# Patient Record
Sex: FEMALE | Race: WHITE
[De-identification: names, ages, dates, MRNs, and addresses within clinical notes are randomized per-mention and may not be internally consistent; named-entity substitution may affect disease eponyms.]

---

## 2017-04-07 ENCOUNTER — HOSPITAL ENCOUNTER (EMERGENCY)
Dept: HOSPITAL 68 - ERH | Age: 47
End: 2017-04-07
Payer: COMMERCIAL

## 2017-04-07 VITALS — WEIGHT: 128 LBS | BODY MASS INDEX: 21.85 KG/M2 | HEIGHT: 64 IN

## 2017-04-07 VITALS — DIASTOLIC BLOOD PRESSURE: 61 MMHG | SYSTOLIC BLOOD PRESSURE: 124 MMHG

## 2017-04-07 DIAGNOSIS — R07.9: ICD-10-CM

## 2017-04-07 DIAGNOSIS — F17.210: ICD-10-CM

## 2017-04-07 DIAGNOSIS — J44.9: Primary | ICD-10-CM

## 2017-04-07 LAB
ABSOLUTE GRANULOCYTE CT: 10.6 /CUMM (ref 1.4–6.5)
BASOPHILS # BLD: 0.1 /CUMM (ref 0–0.2)
BASOPHILS NFR BLD: 0.6 % (ref 0–2)
EOSINOPHIL # BLD: 0.4 /CUMM (ref 0–0.7)
EOSINOPHIL NFR BLD: 3 % (ref 0–5)
ERYTHROCYTE [DISTWIDTH] IN BLOOD BY AUTOMATED COUNT: 14.5 % (ref 11.5–14.5)
GRANULOCYTES NFR BLD: 78.7 % (ref 42.2–75.2)
HCT VFR BLD CALC: 39.4 % (ref 37–47)
LYMPHOCYTES # BLD: 1.5 /CUMM (ref 1.2–3.4)
MCH RBC QN AUTO: 30.5 PG (ref 27–31)
MCHC RBC AUTO-ENTMCNC: 33.7 G/DL (ref 33–37)
MCV RBC AUTO: 90.7 FL (ref 81–99)
MONOCYTES # BLD: 0.9 /CUMM (ref 0.1–0.6)
PLATELET # BLD: 446 /CUMM (ref 130–400)
PMV BLD AUTO: 6.2 FL (ref 7.4–10.4)
RED BLOOD CELL CT: 4.35 /CUMM (ref 4.2–5.4)
WBC # BLD AUTO: 13.5 /CUMM (ref 4.8–10.8)

## 2017-04-07 NOTE — ED DYSPNEA/ASTHMA COMPLAINT
History of Present Illness
 
General
Chief Complaint: Dyspnea (COPD, CHF, Other)
Stated Complaint: SOB SINCE LAST PM
Source: patient
Exam Limitations: no limitations
Allergies
Coded Allergies:
acetaminophen (From FIORICET) (PT REMEMBERS GETTING REALLY SICK 17)
butalbital (From FIORICET) (PT REMEMBERS GETTING REALLY SICK 17)
caffeine (From FIORICET) (PT REMEMBERS GETTING REALLY SICK 17)
tramadol (Severe, SEIZURE 17)
 
Triage Note:
TRIAGE: PT TO ER C/C DIFFICULTY BREATHING SINCE
 LAST NIGHT, WORSE TODAY.  TODAY ALSO HAVING C/P.
 USING INHALERS WITH SLIGHT/TEMPORARY RELIEF NOTED.
 +TACHYPNEA, +INCREASED WORK OF BREATHING.
 DIFFICULTY SPEAKING IN COMPLETE SENTENCES.
Triage Nurses Notes Reviewed? yes
HPI:
This patient is a 46-year-old female with past medical history including 
pneumonia and COPD who presented to the emergency department today for 
evaluation of difficulty breathing.  The patient reported that she began having 
difficulty breathing last night.  She reported that it has been progressively 
getting worse.  She has been using her inhaler without any relief of her 
symptoms.  She reported that she had quit smoking, but recently started smoking 
again 3 days ago.  The patient reported that she has had similar symptoms in the
past when she had pneumonia.  The patient reported that she is having some left-
sided chest pain with deep inspiration.  It is approximately an 8 out of 10, 
aching, nonradiating.  The pain is constant.  The patient does not have a 
history of any blood clots.  She is not on any exogenous estrogen or birth 
control.  No unilateral leg swelling or pain.  The patient has not had any 
recent trauma, surgery, or prolonged immobilization.  She does have a cough 
intimately productive of clear sputum.  She reported associated nausea.  No 
abdominal pain or vomiting.
(CESILIA PETER,MIKALA)
 
Vital Signs & Intake/Output
Vital Signs & Intake/Output
 Vital Signs
 
 
Date Time Temp Pulse Resp B/P Pulse O2 O2 Flow FiO2
 
     Ox Delivery Rate 
 
1907     95 Room Air Room Air 
 
 190 98.0 64 20 124/61 95 Room Air Room Air 
 
 1747     96   
 
 1702 97.5 66 28 123/79 91 Room Air  
 
 
 ED Intake and Output
 
 
  0000  1200
 
Intake Total  
 
Output Total  
 
Balance  
 
   
 
Patient 128 lb 
 
Weight  
 
 
Reconcile Medications
Albuterol Sulfate (Proair Hfa) 0.09 MG/Actuation VINCENT   2 PUFF INH PRN COPD  (
Reported)
Aripiprazole (Abilify) 30 MG TABLET   1 TAB PO DAILY DEPRESSION  (Reported)
Azithromycin 250 MG TABLET   1 DP PO AD COPD
     2 the first day followed by 1 for days 2-5
Carbamazepine 200 MG TAB   2 TAB PO DAILY BIPOLAR  (Reported)
Carbamazepine (Tegretol) 200 MG TAB   3 TAB PO AT BEDTIME BIPOLAR  (Reported)
Gabapentin 300 MG CAPSULE   2 CAP PO 4 TIMES/DAY MENTAL HEALTH  (Reported)
Hydroxyzine Pamoate (Vistaril) 50 MG CAPSULE   2 TAB PO NIGHTLY MENTAL HEALTH  (
Reported)
Methylprednisolone. (Medrol) 4 MG TAB.DS.PK   1 DP PO AD COPD
     6 on day 1 then reduce by one tablet daily until gone
Naproxen (Naprosyn) 500 MG TABLET   1 TAB PO BID PRN PAIN
Ondansetron HCl (Zofran) 4 MG TABLET   1 TAB PO Q6-8P PRN NAUSEA
Oxycodone HCl/Acetaminophen (Percocet 5-325 MG Tablet) 5 MG-325 MG TABLET   1 
TAB PO TID PRN PAIN
SERTRALINE HCL (Sertraline Hydrochloride) 100 MG TABLET   2 TAB PO DAILY MENTAL 
HEALTH  (Reported)
Sumatriptan SUCCINATE (Imitrex 100 MG TAB) 100 MG TABLET   1 TAB PO AD PRN 
MIGRAINES  (Reported)
     with fluids as early as possible after the onset of a migraine attack;may 
repeat after 2 hours if headache returns, not to exce
 
(SHANE SUNG,CANELO CURTIS)
 
Past History
 
Travel History
Traveled to [place] past 21 day No
 
Medical History
Any Pertinent Medical History? see below for history
Neurological: migraine
EENT: NONE
Cardiovascular: AFIB IN THE PAST
Respiratory: COPD, MRSA PNEUMONIA
Gastrointestinal: NONE
Hepatic: NONE
Renal: nephrolithiasis
Musculoskeletal: NONE
Psychiatric: bipolar disease
Endocrine: NONE
Blood Disorders: NONE
Cancer(s): NONE
GYN/Reproductive: UTERINE ABLATION
Other Medical Hx:
Alcohol abuse in the past
History of MRSA: Yes
History of VRE: No
History of CDIFF: No
 
Surgical History
Surgical History: non-contributory
 
Psychosocial History
Who do you live with Patient/Self
Services at Home None
What is your primary language English
Tobacco Use: Current Daily Use
Daily Tobacco Use Amount/Type: =< 4 Cigarettes daily
ETOH Use: denies use
Illicit Drug Use: denies illicit drug use
 
Family History
Family History, If Any:
FATHER
  Kidney stones
 
Hx Contributory? No
(MIKALA LOMAS PA-C)
 
Review of Systems
 
Review of Systems
Constitutional:
Reports: no symptoms. 
EENTM:
Reports: no symptoms. 
Respiratory:
Reports: see HPI. 
Cardiovascular:
Reports: see HPI. 
GI:
Reports: see HPI. 
Genitourinary:
Reports: no symptoms. 
Musculoskeletal:
Reports: no symptoms. 
Skin:
Reports: no symptoms. 
Neurological/Psychological:
Reports: no symptoms. 
All Other Systems: Reviewed and Negative
(MIKALA LOMAS PA-C)
 
Physical Exam
 
Physical Exam
Respiratory: moderate respiratory distress.  Chest nontender.  Scattered rhonchi
and wheezes heard throughout all lung fields.  No stridor or rales.
Comments:
Well-developed well-nourished person in moderate respiratory distress
HEENT: Normal EENT exam, head normocephalic, moist mucous membranes
PERRLA bilaterally
Neck: Supple, no lymphadenopathy
Back: Normal inspection
Cardiovascular: Regular rate and rhythm with no murmurs, rubs, or gallops
Abdomen: Soft, nontender, nondistended
Extremity: No edema, no calf tenderness to palpation, normal and equal pulses.
Neuro: Alert oriented x3, cranial nerves II through XII grossly intact.
Skin: No appreciable rash on exposed skin, skin is warm and dry.
Psych: Mood and affect is normal, memory and judgment is normal.
 
Core Measures
ACS in differential dx? Yes
Severe Sepsis Present: No
Septic Shock Present: No
(MIKALA LOMAS PA-C)
 
Progress
Differential Diagnosis: asthma, AMI, bronchitis, costochondritis, CHF, COPD, 
musculoskeletal pain, pericarditis, pulmonary embolism, pneumonia, pneumothorax,
unstable angina
Plan of Care:
 Orders
 
 
Procedure Date/time Status
 
TROPONIN LEVEL 1716 Complete
 
MAGNESIUM 1716 Complete
 
D-DIMER 1716 Complete
 
COMPREHENSIVE METABOLIC PANEL 1716 Complete
 
CBC WITHOUT DIFFERENTIAL 1716 Complete
 
EKG  171 Active
 
 
 Laboratory Tests
 
 
 
17 1757:
Anion Gap 10, Estimated GFR > 60, BUN/Creatinine Ratio 18.3, Glucose 96, Calcium
9.6, Magnesium 1.5  L, Total Bilirubin 0.5, AST 18, ALT 33, Alkaline Phosphatase
104, Troponin I < 0.01, Total Protein 6.9, Albumin 4.0, Globulin 2.9, Albumin/
Globulin Ratio 1.4, D-Dimer < 200, CBC w Diff NO MAN DIFF REQ, RBC 4.35, MCV 
90.7, MCH 30.5, RDW 14.5, MPV 6.2  L, Gran % 78.7  H, Lymphocytes % 10.9  L, 
Monocytes % 6.8, Eosinophils % 3.0, Basophils % 0.6, Absolute Granulocytes 10.6 
H, Absolute Lymphocytes 1.5, Absolute Monocytes 0.9  H, Absolute Eosinophils 0.4
, Absolute Basophils 0.1, PUBS MCHC 33.7
Diagnostic Imaging:
Viewed by Me: Radiology Read.  Discussed w/RAD: Radiology Read. 
Radiology Impression: PATIENT: CAROLINA GALARZA  MEDICAL RECORD NO: [id number] PRESENT 
AGE: 46  PATIENT ACCOUNT NO: [id number] : 70  LOCATION: [facility] ORDERING 
PHYSICIAN: MIKALA LOMAS PA-C     SERVICE DATE:  EXAM TYPE: RAD - 
XRY-CHEST XRAY, PA AND LATERAL EXAMINATION: XR CHEST CLINICAL INFORMATION: 
Shortness of breath. Rule out pneumonia. COMPARISON: 2015 TECHNIQUE:
2 views of the chest were obtained. FINDINGS: There is a small region of disease
seen within the lingula which may be related to atelectasis or pneumonitis. No 
pneumothorax or significant pleural effusion. Heart normal size. No evidence of 
pulmonary edema. Status post previous cervical spine surgery. IMPRESSION: Small 
region of disease within the lingula which may relate to atelectasis or 
pneumonitis. DICTATED BY: DAKOTA CHU MD  DATE/TIME DICTATED:17 
:RAD.NOLAN  DATE/TIME TRANSCRIBED:17 CONFIDENTIAL, 
DO NOT COPY WITHOUT APPROPRIATE AUTHORIZATION.  <Electronically signed in Other 
Vendor System>                                                                  
                     SIGNED BY: DAKOTA CHU MD [id number]
Initial ED EKG: normal axis, normal intervals, 60 bpm
Comments:
2017 6:11:54 PM: Patient refused ABG.
 
2017 7:10:42 PM: As above the patient's bedside for reevaluation.  She is 
resting comfortably on the stretcher.  D-dimer not elevated.  Troponin not 
elevated.  White blood cell count 13.5.  Oxygen saturation is 95% on room air.  
The patient reported that she is feeling slightly better.  She is stable for 
discharge home with outpatient management of likely COPD exacerbation.
(CESILIA PETER,MIKALA)
 
Departure
 
Departure
Disposition: HOME OR SELF CARE
Condition: Stable
Clinical Impression
Primary Impression: COPD (chronic obstructive pulmonary disease)
Qualifiers:  COPD type: unspecified COPD Qualified Code: J44.9 - Chronic 
obstructive pulmonary disease, unspecified
Referrals:
IVANNA JUDD (PCP/Family)
 
Additional Instructions:
Continue to use your nebulizer machine and previously prescribed inhaler as 
directed.  Take Medrol Dosepak as prescribed.  Take antibiotic as prescribed and
for the full duration.  Please follow-up with your primary care physician and 
return for any worsening symptoms or concerns.
Departure Forms:
Customer Survey
General Discharge Information
Prescriptions:
Current Visit Scripts
Methylprednisolone. (Medrol) 1 DP PO AD 
     #1 DP 
     6 on day 1 then reduce by one tablet daily until gone
 
Naproxen (Naprosyn) 1 TAB PO BID PRN PAIN
     #20 TAB 
 
Azithromycin 1 DP PO AD 
     #6 TAB 
     2 the first day followed by 1 for days 2-5
 
 
(MIKALA LOMAS PA-C)
 
PA/NP Co-Sign Statement
Statement:
ED Attending supervision documentation-
 
[] I saw and evaluated the patient. I have also reviewed all the pertinent lab 
results and diagnostic results. I agree with the findings and the plan of care 
as documented in the PA's/NP's documentation. 
 
[x] I have reviewed the ED Record and agree with the PA's/NP's documentation.
 
[] Additions or exceptions (if any) to the PAs/NP's note and plan are 
summarized below:
[]
 
(SHANE SUNG,CANELO CURTIS)
 
Critical Care Note
 
Critical Care Note
Critical Care Time: non-applicable
(MIKALA LOMAS PA-C)

## 2017-04-07 NOTE — RADIOLOGY REPORT
EXAMINATION:
XR CHEST
 
CLINICAL INFORMATION:
Shortness of breath. Rule out pneumonia.
 
COMPARISON: February 5, 2015
 
TECHNIQUE:
2 views of the chest were obtained.
 
FINDINGS:
There is a small region of disease seen within the lingula which may be
related to atelectasis or pneumonitis. No pneumothorax or significant pleural
effusion. Heart normal size. No evidence of pulmonary edema. Status post
previous cervical spine surgery.
 
IMPRESSION:
Small region of disease within the lingula which may relate to atelectasis or
pneumonitis.

## 2017-06-13 ENCOUNTER — HOSPITAL ENCOUNTER (OUTPATIENT)
Dept: HOSPITAL 68 - STS | Age: 47
End: 2017-06-13
Attending: UROLOGY
Payer: COMMERCIAL

## 2017-06-13 VITALS — HEIGHT: 64 IN | BODY MASS INDEX: 21.34 KG/M2 | WEIGHT: 125 LBS

## 2017-06-13 DIAGNOSIS — J45.909: ICD-10-CM

## 2017-06-13 DIAGNOSIS — N20.0: Primary | ICD-10-CM

## 2017-06-13 DIAGNOSIS — F17.200: ICD-10-CM

## 2017-06-13 NOTE — OPERATIVE REPORT
Operative/Inv Procedure Report
Surgery Date: 06/13/17
Name of Procedure:
left renal ESWL: fluoroscopy
Pre-Operative Diagnosis:
bilateral renal stones
Post-Operative Diagnosis:
same
Estimated Blood Loss: none
Surgeon/Assistant:
SHANEL SHEIKH MD
 
Anesthesia: moderate sedation
Drains:
none
Specimens:
none
Complications:
none
 
Operative/Procedure Note
Note:
The patient was taken to the operating room and placed on the ESWL table in 
supine position.  With the patient awake and participating, timeout was 
performed to confirm correct identity, procedure, laterality, anesthesia, and 
other pertinent patt-operative information.  After adequate anesthesia, the 
patient was positioned so that the patient's left flank was positioned over the 
table cut-out, overlying the dome of the treatment head.  Once the patient was 
adequately sedated, fluoroscopy, as well as Renal ultrasound  was used to locate
the LEFT renal stone. Renal US confirmed the presence of the stone  which 
measured it to be approximately 6 mm upper pole stone.  The stone was visible 
with fluoroscopy.  Renal US revealed, no hydronephrosis, and no solid tumor, and
presence of the stone.  The position of the stone was optimized by using 
fluoroscopy in AP and oblique views;placing the stone within the ESWL c-arm 
crosshairs.  Once the stone's position was optimized, the LEFT renal E.S.W.L. 
was initiated at low energy level.  After noting the patient's tolerance to the 
shockwaves, the intensitiy was ramped up to maximum level.  At the end of the 
procedure, the left renal stone had dissintegrated. Of note, a total of 2500 
shockwaves were delivered to the stone.  
 
The patient tolerated the ESWL procedures well, was awakened, then taken to 
recovery in satisfactory condition via stretcher.  The patient was dischared 
home with pain medications, diet orders, and intructions to catch fragments by 
straining the urine.  The patient to to have follow-up renal ultrasound and KUB 
in 1 to 2 weeks, prior to follow-up visit in my office. He will then proceed 
with metabolic stone work-up.
Discharge Disposition: Same Day Admissions
CC:
SHANEL SHEIKH MD

## 2017-06-27 ENCOUNTER — HOSPITAL ENCOUNTER (OUTPATIENT)
Dept: HOSPITAL 68 - STS | Age: 47
End: 2017-06-27
Attending: UROLOGY
Payer: COMMERCIAL

## 2017-06-27 VITALS — BODY MASS INDEX: 21.34 KG/M2 | WEIGHT: 125 LBS | HEIGHT: 64 IN

## 2017-06-27 DIAGNOSIS — N20.0: Primary | ICD-10-CM

## 2017-06-27 DIAGNOSIS — F17.200: ICD-10-CM

## 2017-06-27 DIAGNOSIS — J44.9: ICD-10-CM

## 2017-06-27 NOTE — OPERATIVE REPORT
Operative/Inv Procedure Report
Surgery Date: 06/27/17
Name of Procedure:
right renal ESWL/fluoroscopy
Pre-Operative Diagnosis:
right stone with colic.
Post-Operative Diagnosis:
same
Estimated Blood Loss: scant, n/a
Surgeon/Assistant:
SHANEL SHEIKH MD
 
Anesthesia: moderate sedation
Complications:
none
 
Operative/Procedure Note
Note:
The patient was taken to the operating room placed on the OR table in supine 
position.  Timeout was performed, with the patient awake, in order to confirm 
correct procedure, laterality, anesthesia, and other pertinent perioperative 
information.  After adequate anesthesia and antibiotics, the patient was then 
positioned over the ESWL table cutout overlying the treatment dome.  Fluoroscopy
, using AP and oblique views, as well as renal ultrasound, or performed in order
to locate the stone.  The position of the stone was optimized and positioned in 
the middle of the ESWL crosshairs.  The stone was measured to be approximately 7
mm in size.  ESWL was initiated at low power, and after 200 shockwaves delivered
, noting the patient's tolerance to the shockwaves, the power was increased to 
maximum.  At the end of 2500 shockwaves, fluoroscopy confirms the change in 
consistency of the stone, indicating shattering of the stone.  The patient 
tolerated this procedure well.
 
 
The patient tolerated the procedures well, and was taken to the recovery room in
satisfactory condition.  The patient is discharged home with pain medication, 
and follow-up instructions with in 2-3 weeks' time.
Discharge Disposition: Same Day Admissions
CC:
SHANEL SHEIKH MD

## 2017-07-27 ENCOUNTER — HOSPITAL ENCOUNTER (OUTPATIENT)
Dept: HOSPITAL 68 - STS | Age: 47
End: 2017-07-27
Attending: UROLOGY
Payer: COMMERCIAL

## 2017-07-27 VITALS — WEIGHT: 125 LBS | BODY MASS INDEX: 21.34 KG/M2 | HEIGHT: 64 IN

## 2017-07-27 DIAGNOSIS — J44.9: ICD-10-CM

## 2017-07-27 DIAGNOSIS — N23: ICD-10-CM

## 2017-07-27 DIAGNOSIS — F17.200: ICD-10-CM

## 2017-07-27 DIAGNOSIS — N20.1: Primary | ICD-10-CM

## 2017-07-27 DIAGNOSIS — Z87.442: ICD-10-CM

## 2017-07-28 NOTE — OPERATIVE REPORT
Operative/Inv Procedure Report
Surgery Date: 07/27/17
Name of Procedure:
Cystoscopy.  Right flexible ureteroscopy with YAG laser standby.
Pre-Operative Diagnosis:
Severe right renal colic with multiple stone fragments in the right ureter, all 
extracted. 
Post-Operative Diagnosis:
Same
Estimated Blood Loss: scant
Surgeon/Assistant:
SHANEL SHEIKH MD
 
Anesthesia: moderate sedation
Specimens:
None
Complications:
None
 
Operative/Procedure Note
Note:
The patient was taken to the operating room and placed on the OR table in supine
position.  Timeout was performed in order to confirm correct patient, procedure,
laterality, and other pertinent information with pt. awake.  After adequate 
anesthesia, and antibiotics, the patient was then placed in yellow-fin lithotomy
stirrups, draped and prepped in the usual surgical fashion.  A 22 Maori 
cystoscope sheath with 30 angle lens was inserted into the bladder without 
difficulty. Upon entering the bladder, the bladder was noted to be free of tumor
, free of stone.  Both orifices were in their orthotopic position.  The right 
ureter orifice was intubated with an 8fr cone-tip catheter and a retrograde 
pyelogram with fluoroscopy was performed. No filling defect was seen. The cone-
tipped catheter was removed, followed by insertion of a 0.035 Glidewire, which 
was advanced into the right renal pelvis without difficulty, and placement was 
confirmed on fluoroscopy.  Leaving the Glidewire in place, and the flexible 
ureteroscope was inserted over the gluidewire into the bladder.  The flexible 
ureteroscope was advanced up the right ureter with fluoroscopy visualization, 
following the gluidewire.   Pyeloscopy and calyxoscopy, of the upper, middle, 
and lower poles reveal no evidence of tumor.  Tiny stone fragments were noted in
the middle and lower poles of the kidney.  Using a 0 tip basket, I was unable to
extract a fragment due to the extremely small nature of the fragments..   
Additionally, no stones, nor any tumor was visualized in the renal pelvis. The 
entire length of the ureter was also visualized carefully on the way out 
evealing The bladder was then drained at the endof the case.  The patient 
tolerated the procedure well, and was then taken to the recovery room in 
satisfactory condition. 
Findings:
Tiny stone fragments all flushed out, unable to grasp with a 0 tip basket.
Discharge Disposition: PACU
CC:
SHANEL SHEIKH MD

## 2017-07-28 NOTE — RADIOLOGY REPORT
EXAMINATION:
XR ABDOMEN
 
CLINICAL INFORMATION:
Right ureteroscopy.
 
COMPARISON:
X-ray of the abdomen 07/19/2017 and renal ultrasound 07/19/2017.
 
TECHNIQUE:
6 spot images obtained in the operating room under the direction of Dr. Juarez. Fluoroscopy time was 12 seconds.
 
FINDINGS:
Images demonstrate wire and tube placement in the right upper collecting
system with injection of contrast filling the renal pelvis and calyces during
reported right ureteroscopy. Side markers are not present on the images.
 
IMPRESSION:
As above, images obtained during right ureteroscopy.

## 2018-02-06 ENCOUNTER — HOSPITAL ENCOUNTER (OUTPATIENT)
Dept: HOSPITAL 68 - STS | Age: 48
Setting detail: OBSERVATION
LOS: 1 days | End: 2018-02-07
Attending: UROLOGY | Admitting: UROLOGY
Payer: COMMERCIAL

## 2018-02-06 VITALS — DIASTOLIC BLOOD PRESSURE: 60 MMHG | SYSTOLIC BLOOD PRESSURE: 100 MMHG

## 2018-02-06 VITALS — HEIGHT: 64 IN | BODY MASS INDEX: 22.02 KG/M2 | WEIGHT: 129 LBS

## 2018-02-06 VITALS — SYSTOLIC BLOOD PRESSURE: 110 MMHG | DIASTOLIC BLOOD PRESSURE: 65 MMHG

## 2018-02-06 DIAGNOSIS — N23: ICD-10-CM

## 2018-02-06 DIAGNOSIS — F17.200: ICD-10-CM

## 2018-02-06 DIAGNOSIS — R56.9: ICD-10-CM

## 2018-02-06 DIAGNOSIS — J44.9: ICD-10-CM

## 2018-02-06 DIAGNOSIS — G89.18: Primary | ICD-10-CM

## 2018-02-06 DIAGNOSIS — Z87.442: ICD-10-CM

## 2018-02-06 DIAGNOSIS — G89.29: ICD-10-CM

## 2018-02-06 DIAGNOSIS — Z86.14: ICD-10-CM

## 2018-02-06 PROCEDURE — G0378 HOSPITAL OBSERVATION PER HR: HCPCS

## 2018-02-06 NOTE — OPERATIVE REPORT
Operative/Inv Procedure Report
Surgery Date: 02/06/18
Name of Procedure:
cystoscopy: bilat. ureteroscopy, laser standby.
Pre-Operative Diagnosis:
bilateral colic
Post-Operative Diagnosis:
bilateral renal crystals-no significant size stone.
Estimated Blood Loss: eun
Surgeon/Assistant:
Shahzad Juarez MD
 
Anesthesia: moderate sedation
Complications:
none
 
Operative/Procedure Note
Note:
Patient was taken to the operating room and placed on the OR table in supine 
position.  Timeout was performed, with the patient awake, in order to confirm 
correct identity, procedure, laterality, antibiotics, anesthesia, and other 
pertinent patt-operative information.  After adequate anesthesia and antibiotics
, the patient was then placed in lithotomy stirrups, draped and prepped in the 
usual surgical fashion.  
 
A 22 Slovenian cystoscope sheath with 30 angle lens was inserted without 
difficulty.  Upon entering the bladder, the bladder was noted to be free of 
tumor, and free of stone.  Using a ureteral open-ended stent, starting with the 
left ureter, a retrograde pyelograms was perfomed, with fluoroscopy, revealing 
no filling defects, with mild left hydronephrosos.  The left side was noted to 
have quick and complete drainage of the contrast material,  after the removal of
the ureter open-ended stent. 
 
The same retrograde procedure was performed on the right side, again revealing 
no filling deffect, and brisk efflux of contrast material.  As the pt has been c
/o severe renal colic bilat, and demand to proceed with ureteroscopy diagnostic/
treatment of potential stone, the plan for bilateral ureterscopy proceeded.  
Holmium/YAG laser was on standby and turned on.  The left orifice was intubated 
with a 0.035 gluide wire and advanced to the right renal pelvis without 
difficulty.  Using the gluide wire, and fluoroscope, the flexible uretersocope 
was placed over the gluide wire and advanced to the right renal pelvis with 
fluoroscopic guidance.  Thorough calycoscopy and pyeloscopy confirms NO stone, 
and NO tumor in the renal calyxes, nor renal pelvis.  At this point, the 
ureteroscope was then gently retracted from the left renal pelvis without 
difficulty, and no other stone, nor any tumor, was visualized along the left 
ureter with direct visualization.
 
A similar procedure was performed on the right side ureter and kidney:
 The 22 Slovenian cystoscope sheath with 30 angle lens was re-inserted into the 
bladder without difficulty.  Using a ureteral open-ended stent inserted into the
right ureter orifice, a retrograde pyelograms was perfomed, with fluoroscopy.  
This revealed no filling defects, with quick and adequate drainage of the 
contrast, after the removal of the ureter open-ended stent.  However, once again
, as the pt has been c/o severe colic bilat, the plan for bilat. ureterscopy 
proceeded.  Holmium/YAG laser was on standby. The right orifice was intubated 
with a 0.035 gluide wire and advanced to the right renal pelvis without 
difficulty.  Using the gluide wire, and fluoroscope, the flexible uretersocope 
was rail-roaded over the gluide wire, and advanced to the right renal pelvis, 
with fluoroscopic guidance.  Thorough calycoscopy, and pyeloscopy confirms no 
stone in the calyx, nor renal pelvis.  The ureteroscope was then gently 
retracted from the right renal pelvis without difficulty.  Visualizing the 
entire ureter, there were no additional findins.  The Yag Laser on standby, was 
now powered down.   
 
The bladder was then drained.  All sponge needle and instrument count were 
correct at the end of the case.  The patient tolerated the procedure well was 
then taken to the recovery room in satisfactory condition.  She is discharged 
home with antibiotics and pain meds. 
 
 
Discharge Disposition: PACU
CC:
Shahzad Juarez MD

## 2018-02-06 NOTE — PN- UROLOGY
Surgical Brief Attending Note
Brief Attending Note:
pt post bilat. ureteroscopy wiht dilatation of bilateral ureter orifices.  pt 
with intractable bilateral renal colic pain postop.  Patient will require 23 
hour observation with IV fluids and IV narcotics.

## 2018-02-06 NOTE — RADIOLOGY REPORT
EXAMINATION:
CR ABDOMEN/INTRAOPERATIVE FLUOROSCOPY
 
CLINICAL INDICATION:
Bilateral ureteroscopy in OR.
 
COMPARISON:
CT scan of the abdomen and pelvis dated 02/01/2018.
 
TECHNIQUE/FINDINGS:
Fluoroscopic equipment was dedicated to the operating room for the
performance of an intraoperative procedure. Single spot film was acquired and
is archived in PACS. Please refer to operative notes for procedural detail.
 
FLUOROSCOPY TIME:
0.01 minutes.
 
IMPRESSION:
Administrative dictation for intraoperative fluoroscopy and image archiving
in PACS. Please refer to operative notes for details.

## 2018-02-07 VITALS — SYSTOLIC BLOOD PRESSURE: 96 MMHG | DIASTOLIC BLOOD PRESSURE: 56 MMHG

## 2018-02-07 LAB
ABSOLUTE GRANULOCYTE CT: 4.1 /CUMM (ref 1.4–6.5)
BASOPHILS # BLD: 0.1 /CUMM (ref 0–0.2)
BASOPHILS NFR BLD: 0.8 % (ref 0–2)
EOSINOPHIL # BLD: 0.2 /CUMM (ref 0–0.7)
EOSINOPHIL NFR BLD: 2.9 % (ref 0–5)
ERYTHROCYTE [DISTWIDTH] IN BLOOD BY AUTOMATED COUNT: 14.5 % (ref 11.5–14.5)
GRANULOCYTES NFR BLD: 66.7 % (ref 42.2–75.2)
HCT VFR BLD CALC: 37.1 % (ref 37–47)
LYMPHOCYTES # BLD: 1.4 /CUMM (ref 1.2–3.4)
MCH RBC QN AUTO: 31.1 PG (ref 27–31)
MCHC RBC AUTO-ENTMCNC: 33.6 G/DL (ref 33–37)
MCV RBC AUTO: 92.7 FL (ref 81–99)
MONOCYTES # BLD: 0.4 /CUMM (ref 0.1–0.6)
PLATELET # BLD: 520 /CUMM (ref 130–400)
PMV BLD AUTO: 6.4 FL (ref 7.4–10.4)
RED BLOOD CELL CT: 4 /CUMM (ref 4.2–5.4)
WBC # BLD AUTO: 6.2 /CUMM (ref 4.8–10.8)

## 2018-02-25 ENCOUNTER — HOSPITAL ENCOUNTER (EMERGENCY)
Dept: HOSPITAL 68 - ERH | Age: 48
End: 2018-02-25
Payer: COMMERCIAL

## 2018-02-25 VITALS — SYSTOLIC BLOOD PRESSURE: 126 MMHG | DIASTOLIC BLOOD PRESSURE: 86 MMHG

## 2018-02-25 VITALS — HEIGHT: 64 IN | WEIGHT: 120 LBS | BODY MASS INDEX: 20.49 KG/M2

## 2018-02-25 DIAGNOSIS — K08.89: Primary | ICD-10-CM

## 2018-02-25 NOTE — ED THROAT/DENTAL COMPLAINT
History of Present Illness
 
General
Chief Complaint: Sore Throat, Dental Pain
Stated Complaint: TOOTH PULLED LAST WEEK, INCREASING PAIN
Source: patient, old records, friend
Exam Limitations: no limitations
 
Vital Signs & Intake/Output
Vital Signs & Intake/Output
 Vital Signs
 
 
Date Time Temp Pulse Resp B/P B/P Pulse O2 O2 Flow FiO2
 
     Mean Ox Delivery Rate 
 
02/25 1951 97.5 108 16 126/86  94 Room Air  
 
 
 
Allergies
Coded Allergies:
butalbital (From FIORICET) (PT REMEMBERS GETTING REALLY SICK 04/07/17)
tramadol (Severe, SEIZURE 04/07/17)
prednisone (LEG CRAMPS 06/26/17)
 
Reconcile Medications
Albuterol Sulfate (Proair Hfa) 90 MCG HFA.AER.AD   2 PUF INH PRN COPD  (Reported
)
Budesonide/Formoterol Fumarate (Symbicort 160-4.5 Mcg Inhaler) 160 MCG-4.5 MCG/
ACTUATION HFA.AER.AD   2 PUF INH BID COPD  (Reported)
Docusate Sodium 100 MG CAPSULE   100 MG PO DAILY AS NEEDED PRN CONSTIPATION
Ondansetron HCl/Pf (Ondansetron HCl 4 MG/2 Ml Vial) 4 MG/2 ML VIAL   4 MG PO Q6P
PRN NAUSEA/VOMITING
Oxycodone HCl/Acetaminophen (Percocet  MG Tablet) 10 MG-325 MG TABLET   1 
TAB PO 4 TIMES/DAY PAIN  (Reported)
Oxycodone HCl/Acetaminophen (Percocet 5-325 MG Tablet) 5 MG-325 MG TABLET   1-2 
TAB PO Q6P PRN PAIN
Phenazopyridine HCl 100 MG TABLET   200 MG PO BID PRN burning pain
     PO BID PRN DYSURIA
Sertraline HCl (Zoloft) 100 MG TABLET   1 TAB PO DAILY MENTAL HEALTH  (Reported)
Zolpidem Tartrate 5 MG TABLET   5 MG PO QHS PRN SLEEP
 
Triage Note:
PT TO ER C/C RIGHT LOWER GUM LINE PAIN 8/10 AT
 SITE OF DENTAL EXTRACTION FROM 2/19. AFEBRILE.
Triage Nurses Notes Reviewed? yes
HPI:
Patient had a tooth extracted last week.  Patient continues to have pain at the 
site.  There is no swelling.  The pain as 10 out 10 and is constant.  The pain 
is worsened with "quit.  There is no swelling.  There are no fevers or chills.  
Patient has no appointment tomorrow morning at 10:30 but could not wait any 
longer secondary to pain.
 
Past History
 
Travel History
Traveled to [place] past 21 day No
 
Medical History
Any Pertinent Medical History? see below for history
Neurological: migraine
EENT: NONE
Cardiovascular: hyperlipidemia, AFIB IN THE PAST
Respiratory: COPD, MRSA PNEUMONIA
Gastrointestinal: NONE
Hepatic: NONE
Renal: nephrolithiasis
Musculoskeletal: NONE
Psychiatric: bipolar disease
Endocrine: NONE
Blood Disorders: NONE
Cancer(s): NONE
GYN/Reproductive: UTERINE ABLATION
Other Medical Hx:
Alcohol abuse in the past
History of MRSA: Yes
History of VRE: No
History of CDIFF: No
Influenza Vaccine: 10/10/17
 
Surgical History
Surgical History: non-contributory
 
Psychosocial History
Who do you live with Patient/Self
Services at Home None
What is your primary language English
Tobacco Use: Current Daily Use
Daily Tobacco Use Amount/Type: => 5 Cigarettes daily
ETOH Use: occasional use
Illicit Drug Use: denies illicit drug use
 
Family History
Family History, If Any:
FATHER
  Kidney stones
 
Hx Contributory? No
 
Review of Systems
 
Review of Systems
Constitutional:
Reports: no symptoms. 
EENTM:
Reports: see HPI, tooth pain. 
Respiratory:
Reports: no symptoms. 
Cardiovascular:
Reports: no symptoms. 
GI:
Reports: no symptoms. 
Neurological/Psychological:
Reports: no symptoms. 
Immunologic/Allergic:
Reports: no symptoms. 
 
Physical Exam
 
Physical Exam
General Appearance: well developed/nourished, alert, awake, anxious, mild 
distress
Head: atraumatic
Eyes:
Bilateral: PERRL, EOMI. 
Mouth/Throat: normal mouth inspection, pharynx normal, NO EVIDENCE OF DRY SOCKEY
, INFECTION, SWELLING, DISCHARGE
Neck: normal inspection, supple, NO LAD
Neurologic/Psych: no motor/sensory deficits, awake, alert, oriented x 3, normal 
gait, normal mood/affect
 
Core Measures
ACS in differential dx? No
Sepsis Present: No
Sepsis Focused Exam Completed? No
 
Progress
Differential Diagnosis: POST EXTRACTION PAIN
Plan of Care:
PAIN CONTROL AND FOLLOW UP AT TOMORROW'S APPOINTMENT
 
Departure
 
Departure
Disposition: HOME OR SELF CARE
Condition: Stable
Clinical Impression
Primary Impression: Pain, dental
Referrals:
Loi Camilo (PCP/Family)
 
Additional Instructions:
FOLLOW UP WITH THE [facility] AT [facility] TOMORROW AT YOUR APPOINTMENT
TAKE PERCOCET AS NEEDED FOR PAIN
RETURN IF SYMPTOMS WORSEN OR FOR ANY CONCERNS
Departure Forms:
Customer Survey
General Discharge Information
Prescriptions:
Current Visit Scripts
Oxycodone HCl/Acetaminophen (Percocet 5-325 MG Tablet) 1-2 TAB PO Q6P PRN PAIN 
     #6 TAB

## 2018-07-13 ENCOUNTER — HOSPITAL ENCOUNTER (EMERGENCY)
Dept: HOSPITAL 68 - ERH | Age: 48
End: 2018-07-13
Payer: COMMERCIAL

## 2018-07-13 VITALS — BODY MASS INDEX: 17.93 KG/M2 | WEIGHT: 105 LBS | HEIGHT: 64 IN

## 2018-07-13 VITALS — DIASTOLIC BLOOD PRESSURE: 84 MMHG | SYSTOLIC BLOOD PRESSURE: 140 MMHG

## 2018-07-13 DIAGNOSIS — F17.210: ICD-10-CM

## 2018-07-13 DIAGNOSIS — K14.0: Primary | ICD-10-CM

## 2018-07-13 DIAGNOSIS — H92.01: ICD-10-CM

## 2018-07-13 NOTE — ED GENERAL ADULT
History of Present Illness
 
General
Chief Complaint: General Adult
Stated Complaint: EAR INFECTION IN RT EAR, "ULCER UNDER TONGUE"
Source: patient
Exam Limitations: no limitations
 
Vital Signs & Intake/Output
Vital Signs & Intake/Output
 Vital Signs
 
 
Date Time Temp Pulse Resp B/P B/P Pulse O2 O2 Flow FiO2
 
     Mean Ox Delivery Rate 
 
07/13 1833       Room Air  
 
07/13 1833  66 18 140/84  100 Room Air  
 
07/13 1721 98.8 76 16 132/72  97 Room Air  
 
 
 
Allergies
Coded Allergies:
butalbital (From FIORICET) (PT REMEMBERS GETTING REALLY SICK 04/07/17)
tramadol (Severe, SEIZURE 04/07/17)
prednisone (LEG CRAMPS 06/26/17)
 
Reconcile Medications
Betamethasone Dipropionate 0.05 % CREAM..G.   1 LAVERNE TOP BID Aphthous ulcer
     apply to affected area(s)
Chlorhexidine Gluconate (Periogard) 0.12 % MOUTHWASH   15 ML PO BID Aphthous 
ulcer
Cholecalciferol (Vitamin D3) 1,000 UNIT TABLET   1 TAB PO DAILY VITAMIN SUPPORT 
(Reported)
Dextroamphetamine/Amphetamine (Dextroamp-Amphetamin 20 MG Tab) 20 MG TABLET   1 
TAB PO BID MENTAL HEALTH  (Reported)
Gabapentin 600 MG TABLET   1 TAB PO TID MENTAL HEALTH  (Reported)
Hydrocodone/Acetaminophen (Norco 5-325 Tablet) 5 MG-325 MG TABLET   1 TAB PO 
BIDP PRN Pain
Lamotrigine 100 MG TABLET   1 TAB PO QPM SLEEP  (Reported)
Sertraline HCl (Zoloft) 50 MG TABLET   1 TAB PO DAILY MENTAL HEALTH  (Reported)
 
Triage Note:
PT TO ER C/O RIGHT EAR INFECTION AND PT STATES
THAT "I HAVE AN ULCER UNDER MY TONGUE"  PT STATES
THAT HER PCP PLACED PT ON AMOXICILLIN AND PT
STATES I CANT TAKE IT ANYMORE.. PT STATES THAT I
CAN NOT GO THROUGH THE WEEKEND LIKE THIS..
PT ALSO C/O DIZZINESS, HEADACHE.  PT STATES SHE
KEEPS WALKING INTO THINGS..
Triage Nurses Notes Reviewed? yes
Onset: Gradual
Duration: day(s):
Timing: constant
HPI:
48-year-old female with history of migraines, A. fib, HLD, COPD, bipolar 
disorder presenting with right ear pain and a tongue ulcer 1 week.  Patient 
reports that her primary care provider has been treating her for a right-sided 
otitis media with oral antibiotics, which she has been compliant with.  Reports 
that her right ear pain has been gradually improving, but still has mild pain.  
States that shortly after her right ear infection she noticed a painful ulcer to
her tongue.  Denies fevers or drainage.  Patient is currently a 1 ppd smoker.  
Patient also states that since her right ear infection began she has had an 
increase in the frequency of her migraines.  Reports bifrontal throbbing 
headaches associated with lightheadedness, symptoms are characteristic of her 
usual migraines.  Migraines are well controlled with over-the-counter 
medications, and patient currently denies any headache.  Denies head trauma, 
visual changes, nausea, vomiting.
 
Past History
 
Travel History
Traveled to [place] past 21 day No
 
Medical History
Any Pertinent Medical History? see below for history
Neurological: migraine
EENT: NONE
Cardiovascular: hyperlipidemia, AFIB IN THE PAST
Respiratory: COPD, MRSA PNEUMONIA
Gastrointestinal: NONE
Hepatic: NONE
Renal: nephrolithiasis
Musculoskeletal: NONE
Psychiatric: bipolar disease
Endocrine: NONE
Blood Disorders: NONE
Cancer(s): NONE
GYN/Reproductive: UTERINE ABLATION
Other Medical Hx:
Alcohol abuse in the past
History of MRSA: Yes
History of VRE: No
History of CDIFF: No
 
Surgical History
Surgical History: non-contributory
 
Psychosocial History
Who do you live with Patient/Self
Services at Home None
What is your primary language English
Tobacco Use: Current Daily Use
Daily Tobacco Use Amount/Type: => 5 Cigarettes daily
 
Family History
Family History, If Any:
FATHER
  Kidney stones
 
Hx Contributory? No
 
Review of Systems
 
Review of Systems
Constitutional:
Reports: no symptoms. 
EENTM:
Reports: see HPI. 
Respiratory:
Reports: no symptoms. 
Cardiovascular:
Reports: no symptoms. 
GI:
Reports: no symptoms. 
Genitourinary:
Reports: no symptoms. 
Musculoskeletal:
Reports: no symptoms. 
Skin:
Reports: no symptoms. 
Neurological/Psychological:
Reports: see HPI. 
Hematologic/Endocrine:
Reports: no symptoms. 
Immunologic/Allergic:
Reports: no symptoms. 
All Other Systems: Reviewed and Negative
 
Physical Exam
 
Physical Exam
General Appearance: well developed/nourished, no apparent distress, alert, awake
, comfortable
Head: atraumatic, normal appearance
Eyes:
Bilateral: normal appearance, PERRL, EOMI. 
Ears, Nose, Throat: bilateral TM's have good light reflex, no effusions OP 
unremarkable oval ulcer to posterior right lateral tongue, no drainage
Neck: normal inspection
Respiratory: normal breath sounds, lungs clear
Cardiovascular: regular rate/rhythm
Gastrointestinal: soft, non-tender
Back: normal inspection
Extremities: normal inspection
Neurologic/Psych: no motor/sensory deficits, awake, alert, oriented x 3, normal 
gait, normal mood/affect, CNs II-XII nml as tested, cerebellar function intact
Skin: intact, normal color, warm/dry
 
Core Measures
ACS in differential dx? No
CVA/TIA Diagnosis: No
Sepsis Present: No
Sepsis Focused Exam Completed? No
 
Progress
Differential Diagnoses
I considered the following diagnoses in my evaluation of the patient: [
1. otitis media vs otitis externa vs cerumen impaction vs FB
2. tongue laceration vs apthous ulcer vs malignancy
3. likely migraines, low concern for ICH vs meningitis vs psuedo tumor vs brain 
mass]
 
Plan of Care:
Right otitis media seems to be cleared.  Patient instructed to finish the 
remainder of her antibiotics.  Increase in headaches likely triggered by acute 
otitis media.  Instructed to continue over-the-counter pain medications as 
needed for headaches.  Tongue ulcer may be related to viral process, but given 
patient's current smoking status she was informed that malignancy cannot be 
excluded.  Was instructed to follow-up with ENT for reevaluation and possible 
biopsy.  Counseled on supportive care and strict return precautions.
Initial ED EKG: none
 
Departure
 
Departure
Disposition: HOME OR SELF CARE
Condition: Stable
Clinical Impression
Primary Impression: Tongue ulcer
Secondary Impressions: Right ear pain
Referrals:
Loi Beasley (PCP/Family)
 
Martin SUNG,Diego MAR
 
Additional Instructions:
Use Magic mouthwash 4 times a day as needed for pain.  Follow-up with your 
primary care provider and ENT for reevaluation.  Return to the emergency 
department for any new or worsening symptoms.
Departure Forms:
Customer Survey
General Discharge Information
 
Critical Care Note
 
Critical Care Note
Critical Care Time: non-applicable

## 2018-08-11 ENCOUNTER — HOSPITAL ENCOUNTER (EMERGENCY)
Dept: HOSPITAL 68 - ERH | Age: 48
End: 2018-08-11
Payer: COMMERCIAL

## 2018-08-11 VITALS — HEIGHT: 63 IN | BODY MASS INDEX: 17.36 KG/M2 | WEIGHT: 98 LBS

## 2018-08-11 VITALS — DIASTOLIC BLOOD PRESSURE: 72 MMHG | SYSTOLIC BLOOD PRESSURE: 117 MMHG

## 2018-08-11 DIAGNOSIS — R11.10: Primary | ICD-10-CM

## 2018-08-11 LAB
ABSOLUTE GRANULOCYTE CT: 6.7 /CUMM (ref 1.4–6.5)
BASOPHILS # BLD: 0 /CUMM (ref 0–0.2)
BASOPHILS NFR BLD: 0.1 % (ref 0–2)
EOSINOPHIL # BLD: 0.2 /CUMM (ref 0–0.7)
EOSINOPHIL NFR BLD: 1.8 % (ref 0–5)
ERYTHROCYTE [DISTWIDTH] IN BLOOD BY AUTOMATED COUNT: 15.8 % (ref 11.5–14.5)
GRANULOCYTES NFR BLD: 72.4 % (ref 42.2–75.2)
HCT VFR BLD CALC: 41.4 % (ref 37–47)
LYMPHOCYTES # BLD: 1.8 /CUMM (ref 1.2–3.4)
MCH RBC QN AUTO: 32.1 PG (ref 27–31)
MCHC RBC AUTO-ENTMCNC: 34.4 G/DL (ref 33–37)
MCV RBC AUTO: 93.3 FL (ref 81–99)
MONOCYTES # BLD: 0.6 /CUMM (ref 0.1–0.6)
PLATELET # BLD: 662 /CUMM (ref 130–400)
PMV BLD AUTO: 6.6 FL (ref 7.4–10.4)
RED BLOOD CELL CT: 4.44 /CUMM (ref 4.2–5.4)
WBC # BLD AUTO: 9.3 /CUMM (ref 4.8–10.8)

## 2018-08-11 NOTE — ED GENERAL ADULT
History of Present Illness
 
General
Chief Complaint: Nausea, Vomiting, Diarrhea
Stated Complaint: PT C/O N/V "CANT KEEP ANYTHING DOWN"SINCE THURS
Source: patient
Exam Limitations: no limitations
 
Vital Signs & Intake/Output
Vital Signs & Intake/Output
 Vital Signs
 
 
Date Time Temp Pulse Resp B/P B/P Pulse O2 O2 Flow FiO2
 
     Mean Ox Delivery Rate 
 
08/11 0645 97.0 75 18 117/72  96 Room Air  
 
08/11 0255       Room Air  
 
08/11 0232 96.7 76 22 128/77  95   
 
 
 
Allergies
Coded Allergies:
butalbital (From FIORICET) (PT REMEMBERS GETTING REALLY SICK 04/07/17)
tramadol (Severe, SEIZURE 04/07/17)
prednisone (LEG CRAMPS 06/26/17)
 
Reconcile Medications
Betamethasone Dipropionate 0.05 % CREAM..G.   1 LAVERNE TOP BID Aphthous ulcer
     apply to affected area(s)
Chlorhexidine Gluconate (Periogard) 0.12 % MOUTHWASH   15 ML PO BID Aphthous 
ulcer
Cholecalciferol (Vitamin D3) 1,000 UNIT TABLET   1 TAB PO DAILY VITAMIN SUPPORT 
(Reported)
Dextroamphetamine/Amphetamine (Dextroamp-Amphetamin 20 MG Tab) 20 MG TABLET   1 
TAB PO BID MENTAL HEALTH  (Reported)
Gabapentin 600 MG TABLET   1 TAB PO TID MENTAL HEALTH  (Reported)
Hydrocodone/Acetaminophen (Norco 5-325 Tablet) 5 MG-325 MG TABLET   1 TAB PO 
BIDP PRN Pain
Lamotrigine 100 MG TABLET   1 TAB PO QPM SLEEP  (Reported)
Sertraline HCl (Zoloft) 50 MG TABLET   1 TAB PO DAILY MENTAL HEALTH  (Reported)
 
Triage Nurses Notes Reviewed? yes
Onset: Abrupt
Duration: hour(s):
Timing: recent history
Severity: moderate
HPI:
 
 
 
 
8/11/18
40-year-old female presents to the emergency department with multiple episodes 
of vomiting.
The patient states she has a history of bipolar disorder and recently has been 
diagnosed with a tongue lesion and is status post biopsy.  She said she's had 
progressive weight loss over the past several weeks.  She says CAT scan of the 
chest abdomen and pelvis were done and were unremarkable.  She presented in 
moderate distress secondary to retching.  She received IV antiemetics and Ativan
with significant relief.
 
Past History
 
Travel History
Traveled to [place] past 21 day No
 
Medical History
Any Pertinent Medical History? see below for history
Neurological: migraine
EENT: NONE
Cardiovascular: hyperlipidemia, AFIB IN THE PAST
Respiratory: COPD, MRSA PNEUMONIA
Gastrointestinal: NONE
Hepatic: NONE
Renal: nephrolithiasis
Musculoskeletal: NONE
Psychiatric: bipolar disease
Endocrine: NONE
Blood Disorders: NONE
Cancer(s): NONE
GYN/Reproductive: UTERINE ABLATION
Other Medical Hx:
Alcohol abuse in the past
History of MRSA: Yes
History of VRE: No
History of CDIFF: No
 
Surgical History
Surgical History: non-contributory
 
Psychosocial History
Who do you live with Patient/Self
Services at Home None
What is your primary language English
 
Family History
Family History, If Any:
FATHER
  Kidney stones
 
Hx Contributory? No
 
Review of Systems
 
Review of Systems
Constitutional:
Denies: fever. 
EENTM:
Reports: no symptoms. 
Respiratory:
Reports: no symptoms. 
Cardiovascular:
Reports: no symptoms. 
GI:
Reports: vomiting.  Denies: abdominal pain. 
Genitourinary:
Reports: no symptoms. 
Musculoskeletal:
Reports: muscle pain. 
Skin:
Denies: rash. 
Neurological/Psychological:
Reports: no symptoms. 
Hematologic/Endocrine:
Reports: no symptoms. 
Immunologic/Allergic:
Reports: no symptoms. 
 
Physical Exam
 
Physical Exam
General Appearance: alert, awake, anxious, moderate distress
Head: atraumatic
Eyes:
Bilateral: normal appearance, PERRL, EOMI. 
Ears, Nose, Throat: normal pharynx
Neck: normal inspection, supple
Respiratory: normal breath sounds, chest non-tender, no respiratory distress
Cardiovascular: regular rate/rhythm
Peripheral Pulses:
4+ radial (R), 4+ radial (L)
Gastrointestinal: soft, non-tender
Back: normal range of motion
Extremities: normal inspection
Neurologic/Psych: no motor/sensory deficits, awake, alert, oriented x 3
Skin: intact, normal color, warm/dry
Comments:
 
 
The patient is significantly thin.  She does have dry mucous membranes.  She has
a ulceration in the right lateral aspect of her tongue.  Status post biopsy.  
She got dramatic relief with IV fluids IV antiemetics, and Ativan.  She will 
follow-up with her doctor this week or return to the emergency department if 
worse.  Reevaluation of her abdomen was nontender.
 
Core Measures
ACS in differential dx? No
CVA/TIA Diagnosis: No
Sepsis Present: No
Sepsis Focused Exam Completed? No
 
Progress
Differential Diagnoses
I considered the following diagnoses in my evaluation of the patient: [
Dehydration, cyclic vomiting syndrome, pancreatitis, cholecystitis, pregnancy, 
opiate withdrawal]
 
Plan of Care:
 Orders
 
 
Procedure Date/time Status
 
LIPASE 08/11 0226 Complete
 
HUMAN BETA HCG SCREEN 08/11 0226 Complete
 
COMPREHENSIVE METABOLIC PANEL 08/11 0226 Complete
 
CBC WITHOUT DIFFERENTIAL 08/11 0226 Complete
 
 
 Current Medications
 
 
  Sig/Radha Start time  Last
 
Medication Dose  Stop Time Status Admin
 
Morphine Sulfate 2 MG ONCE ONE 08/11 0330 CAN 
 
(MORPHINE SULFATE)   08/11 0331  
 
Ondansetron HCl 4 MG ONCE ONE 08/11 0330 CAN 
 
(Zofran)   08/11 0331  
 
 
 Laboratory Tests
 
 
 
08/11/18 0443:
Anion Gap 9, Estimated GFR > 60, BUN/Creatinine Ratio 24.0, Glucose 97, Calcium 
9.8, Total Bilirubin 0.4, AST 19, ALT 24, Alkaline Phosphatase 51, Total Protein
6.6, Albumin 3.9, Globulin 2.7, Albumin/Globulin Ratio 1.4, Lipase 48, Total 
Beta HCG NEGATIVE
 
08/11/18 0249:
CBC w Diff NO MAN DIFF REQ, RBC 4.44, MCV 93.3, MCH 32.1  H, MCHC 34.4, RDW 15.8
 H, MPV 6.6  L, Gran % 72.4, Lymphocytes % 19.5  L, Monocytes % 6.2, Eosinophils
% 1.8, Basophils % 0.1, Absolute Granulocytes 6.7  H, Absolute Lymphocytes 1.8, 
Absolute Monocytes 0.6, Absolute Eosinophils 0.2, Absolute Basophils 0
 
Initial ED EKG: none
 
Departure
 
Departure
Disposition: STILL A PATIENT
Condition: Stable
Clinical Impression
Primary Impression: Vomiting
Referrals:
Loi Beasley (PCP/Family)
 
Departure Forms:
Customer Survey
General Discharge Information
Comments
 
 
 
 
8/11/18
6:26 AM
Patient's labs are unremarkable other than mild elevation in platelets.  Abdomen
is soft and nontender.  She has had a negative recent CT scan of the chest 
abdomen and pelvis.  She is pending biopsy of her tongue lesion.  She will 
follow-up with her doctor this week
 
Critical Care Note
 
Critical Care Note
Critical Care Time: non-applicable

## 2018-08-13 ENCOUNTER — HOSPITAL ENCOUNTER (EMERGENCY)
Dept: HOSPITAL 68 - ERH | Age: 48
LOS: 1 days | End: 2018-08-14
Payer: COMMERCIAL

## 2018-08-13 VITALS — BODY MASS INDEX: 16.22 KG/M2 | HEIGHT: 64 IN | WEIGHT: 95 LBS

## 2018-08-13 DIAGNOSIS — E86.0: ICD-10-CM

## 2018-08-13 DIAGNOSIS — F17.210: ICD-10-CM

## 2018-08-13 DIAGNOSIS — K14.0: Primary | ICD-10-CM

## 2018-08-13 DIAGNOSIS — J44.9: ICD-10-CM

## 2018-08-13 LAB
ABSOLUTE GRANULOCYTE CT: 9.2 /CUMM (ref 1.4–6.5)
BASOPHILS # BLD: 0.1 /CUMM (ref 0–0.2)
BASOPHILS NFR BLD: 0.7 % (ref 0–2)
EOSINOPHIL # BLD: 0 /CUMM (ref 0–0.7)
EOSINOPHIL NFR BLD: 0.1 % (ref 0–5)
ERYTHROCYTE [DISTWIDTH] IN BLOOD BY AUTOMATED COUNT: 16.4 % (ref 11.5–14.5)
GRANULOCYTES NFR BLD: 75.1 % (ref 42.2–75.2)
HCT VFR BLD CALC: 38.1 % (ref 37–47)
LYMPHOCYTES # BLD: 2.1 /CUMM (ref 1.2–3.4)
MCH RBC QN AUTO: 32 PG (ref 27–31)
MCHC RBC AUTO-ENTMCNC: 33.7 G/DL (ref 33–37)
MCV RBC AUTO: 95 FL (ref 81–99)
MONOCYTES # BLD: 0.8 /CUMM (ref 0.1–0.6)
PLATELET # BLD: 539 /CUMM (ref 130–400)
PMV BLD AUTO: 6.5 FL (ref 7.4–10.4)
RED BLOOD CELL CT: 4.01 /CUMM (ref 4.2–5.4)
WBC # BLD AUTO: 12.2 /CUMM (ref 4.8–10.8)

## 2018-08-13 NOTE — ED GENERAL ADULT
History of Present Illness
 
General
Chief Complaint: General Adult
Stated Complaint: " I HAVE A HOLE IN MY TONGUE"
Source: patient
Exam Limitations: no limitations
 
Vital Signs & Intake/Output
Vital Signs & Intake/Output
 Vital Signs
 
 
Date Time Temp Pulse Resp B/P B/P Pulse O2 O2 Flow FiO2
 
     Mean Ox Delivery Rate 
 
08/13 2219       Room Air  
 
08/13 2037 97.9 88 18 142/89  97 Room Air  
 
 
 ED Intake and Output
 
 
 08/14 0000 08/13 1200
 
Intake Total  
 
Output Total  
 
Balance  
 
   
 
Patient 94 lb 15.99 oz 
 
Weight  
 
Weight Reported by Patient 
 
Measurement  
 
Method  
 
 
 
Allergies
Coded Allergies:
butalbital (From FIORICET) (PT REMEMBERS GETTING REALLY SICK 04/07/17)
tramadol (Severe, SEIZURE 04/07/17)
prednisone (LEG CRAMPS 06/26/17)
 
Reconcile Medications
Betamethasone Dipropionate 0.05 % CREAM..G.   1 LAVERNE TOP BID Aphthous ulcer
     apply to affected area(s)
Chlorhexidine Gluconate (Periogard) 0.12 % MOUTHWASH   15 ML PO BID Aphthous 
ulcer
Cholecalciferol (Vitamin D3) 1,000 UNIT TABLET   1 TAB PO DAILY VITAMIN SUPPORT 
(Reported)
Dextroamphetamine/Amphetamine (Dextroamp-Amphetamin 20 MG Tab) 20 MG TABLET   1 
TAB PO BID MENTAL HEALTH  (Reported)
Gabapentin 600 MG TABLET   1 TAB PO TID MENTAL HEALTH  (Reported)
Hydrocodone/Acetaminophen (Norco 5-325 Tablet) 5 MG-325 MG TABLET   1 TAB PO 
BIDP PRN Pain
Lamotrigine 100 MG TABLET   1 TAB PO QPM SLEEP  (Reported)
Sertraline HCl (Zoloft) 50 MG TABLET   1 TAB PO DAILY MENTAL HEALTH  (Reported)
 
Triage Note:
PT REQUESTING EVALUATION OF A "VERY PAINFULL" SORE
 LIKE AREA TO HER TONGUE.
Triage Nurses Notes Reviewed? yes
Onset: Gradual
Duration: week(s):
Timing: recent history
Injury Environment: home
Severity: mild, moderate
Associated Symptoms: tongue pain at ulceration
HPI:
47 yo woman
presents with an ulceration on the lateral aspect of her tongue for the past 2 
months.
 
"It hurts so much, I can't eat.... I've lost 30 pounds over the past 2 
months.... I had a biopsy, but the results aren't back yet.... I was here a few 
days ago and just feel so dehydrated."
 
She shares that she has no fever, chills, early satiety, dysuria, dyspnea, chest
pain.
 
She is otherwise well. 
 
Past History
 
Travel History
Traveled to [place] past 21 day No
 
Medical History
Any Pertinent Medical History? see below for history
Neurological: migraine
EENT: NONE
Cardiovascular: hyperlipidemia, AFIB IN THE PAST
Respiratory: COPD, MRSA PNEUMONIA
Gastrointestinal: NONE
Hepatic: NONE
Renal: nephrolithiasis
Musculoskeletal: NONE
Psychiatric: bipolar disease
Endocrine: NONE
Blood Disorders: NONE
Cancer(s): NONE
GYN/Reproductive: UTERINE ABLATION
Other Medical Hx:
Alcohol abuse in the past
History of MRSA: Yes
History of VRE: No
History of CDIFF: No
 
Surgical History
Surgical History: non-contributory
 
Psychosocial History
Who do you live with Patient/Self
Services at Home None
What is your primary language English
Tobacco Use: Current Daily Use
Daily Tobacco Use Amount/Type: => 5 Cigarettes daily
 
Family History
Family History, If Any:
FATHER
  Kidney stones
 
Hx Contributory? No
 
Review of Systems
 
Review of Systems
Constitutional:
Reports: no symptoms. 
EENTM:
Reports: no symptoms. 
Respiratory:
Reports: no symptoms. 
Cardiovascular:
Reports: no symptoms. 
GI:
Reports: no symptoms. 
Genitourinary:
Reports: no symptoms. 
Musculoskeletal:
Reports: no symptoms. 
Skin:
Reports: no symptoms. 
Neurological/Psychological:
Reports: no symptoms. 
Hematologic/Endocrine:
Reports: no symptoms. 
Immunologic/Allergic:
Reports: no symptoms. 
All Other Systems: Reviewed and Negative
 
Physical Exam
 
Physical Exam
General Appearance: well developed/nourished, anxious
Head: atraumatic, normal appearance
Eyes:
Bilateral: normal appearance. 
Ears, Nose, Throat: 1 cm ulceration on right lateral aspect of tongue
Neck: normal inspection, supple, full range of motion
Respiratory: normal breath sounds, chest non-tender, no respiratory distress, 
quiet respiration, lungs clear
Cardiovascular: regular rate/rhythm
Gastrointestinal: normal bowel sounds, soft, non-tender, no organomegaly
Back: normal inspection, normal range of motion
Extremities: normal inspection, normal capillary refill, normal range of motion,
no edema
Neurologic/Psych: no motor/sensory deficits, awake, alert, oriented x 3
Skin: intact, normal color, warm/dry
 
Core Measures
ACS in differential dx? No
CVA/TIA Diagnosis: No
Sepsis Present: No
Sepsis Focused Exam Completed? No
 
Progress
Differential Diagnoses
I considered the following diagnoses in my evaluation of the patient: 
tongue ulceration, dehydration vs other. 
Plan of Care:
 Orders
 
 
Procedure Date/time Status
 
HEPATIC FUNCTION PANEL 08/13 2220 Complete
 
CBC WITHOUT DIFFERENTIAL 08/13 2220 Complete
 
BASIC METABOLIC PANEL 08/13 2220 Complete
 
 
 Laboratory Tests
 
 
 
08/13/18 2310:
Anion Gap 9, Estimated GFR > 60, BUN/Creatinine Ratio 21.7, Glucose 92, Calcium 
9.9, Total Bilirubin 0.3, Direct Bilirubin 0.3, AST 18, ALT 27, Alkaline 
Phosphatase 61, Total Protein 7.7, Albumin 4.9, CBC w Diff NO MAN DIFF REQ, RBC 
4.01  L, MCV 95.0, MCH 32.0  H, MCHC 33.7, RDW 16.4  H, MPV 6.5  L, Gran % 75.1,
Lymphocytes % 17.6  L, Monocytes % 6.5, Eosinophils % 0.1, Basophils % 0.7, 
Absolute Granulocytes 9.2  H, Absolute Lymphocytes 2.1, Absolute Monocytes 0.8  
H, Absolute Eosinophils 0, Absolute Basophils 0.1
 
Initial ED EKG: none
 
Departure
 
Departure
Disposition: HOME OR SELF CARE
Condition: Stable
Clinical Impression
Primary Impression: Tongue ulceration
Secondary Impressions: Dehydration
Referrals:
Loi Beasley (PCP/Family)
 
Departure Forms:
Customer Survey
General Discharge Information
Comments
8/14/18, 0.01am... pt feeling better after iv fluids... labs benign... normal 
bun/cr... of note, albumen is WNL, suggestive that her synthetic function is 
intact... I encouraged close follow up with her PMD and ENT to pursue biopsy 
results.  
 
Critical Care Note
 
Critical Care Note
Critical Care Time: non-applicable

## 2018-08-14 VITALS — DIASTOLIC BLOOD PRESSURE: 78 MMHG | SYSTOLIC BLOOD PRESSURE: 136 MMHG

## 2018-09-07 ENCOUNTER — HOSPITAL ENCOUNTER (INPATIENT)
Dept: HOSPITAL 68 - ERH | Age: 48
LOS: 5 days | DRG: 249 | End: 2018-09-12
Attending: INTERNAL MEDICINE | Admitting: INTERNAL MEDICINE
Payer: COMMERCIAL

## 2018-09-07 VITALS — DIASTOLIC BLOOD PRESSURE: 64 MMHG | SYSTOLIC BLOOD PRESSURE: 120 MMHG

## 2018-09-07 VITALS — WEIGHT: 103.56 LBS | BODY MASS INDEX: 17.68 KG/M2 | HEIGHT: 64 IN

## 2018-09-07 DIAGNOSIS — R10.84: ICD-10-CM

## 2018-09-07 DIAGNOSIS — K59.09: ICD-10-CM

## 2018-09-07 DIAGNOSIS — R62.7: ICD-10-CM

## 2018-09-07 DIAGNOSIS — Z91.81: ICD-10-CM

## 2018-09-07 DIAGNOSIS — Z87.11: ICD-10-CM

## 2018-09-07 DIAGNOSIS — K58.1: ICD-10-CM

## 2018-09-07 DIAGNOSIS — F17.210: ICD-10-CM

## 2018-09-07 DIAGNOSIS — F31.9: ICD-10-CM

## 2018-09-07 DIAGNOSIS — K12.0: ICD-10-CM

## 2018-09-07 DIAGNOSIS — G43.909: ICD-10-CM

## 2018-09-07 DIAGNOSIS — E44.1: ICD-10-CM

## 2018-09-07 DIAGNOSIS — Z86.14: ICD-10-CM

## 2018-09-07 DIAGNOSIS — Z88.5: ICD-10-CM

## 2018-09-07 DIAGNOSIS — R53.1: ICD-10-CM
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
## 2018-09-07 DIAGNOSIS — G40.909: ICD-10-CM

## 2018-09-07 DIAGNOSIS — K21.9: ICD-10-CM

## 2018-09-07 DIAGNOSIS — R11.2: Primary | ICD-10-CM

## 2018-09-07 DIAGNOSIS — Z88.8: ICD-10-CM

## 2018-09-07 DIAGNOSIS — Z98.1: ICD-10-CM

## 2018-09-07 DIAGNOSIS — N94.89: ICD-10-CM

## 2018-09-07 LAB
ABSOLUTE GRANULOCYTE CT: 7.8 /CUMM (ref 1.4–6.5)
BASOPHILS # BLD: 0 /CUMM (ref 0–0.2)
BASOPHILS NFR BLD: 0.4 % (ref 0–2)
EOSINOPHIL # BLD: 0.1 /CUMM (ref 0–0.7)
EOSINOPHIL NFR BLD: 0.6 % (ref 0–5)
ERYTHROCYTE [DISTWIDTH] IN BLOOD BY AUTOMATED COUNT: 14.7 % (ref 11.5–14.5)
GRANULOCYTES NFR BLD: 82.4 % (ref 42.2–75.2)
HCT VFR BLD CALC: 35.6 % (ref 37–47)
LYMPHOCYTES # BLD: 1.2 /CUMM (ref 1.2–3.4)
MCH RBC QN AUTO: 32 PG (ref 27–31)
MCHC RBC AUTO-ENTMCNC: 33.6 G/DL (ref 33–37)
MCV RBC AUTO: 95.1 FL (ref 81–99)
MONOCYTES # BLD: 0.4 /CUMM (ref 0.1–0.6)
PLATELET # BLD: 421 /CUMM (ref 130–400)
PMV BLD AUTO: 7 FL (ref 7.4–10.4)
RED BLOOD CELL CT: 3.74 /CUMM (ref 4.2–5.4)
WBC # BLD AUTO: 9.4 /CUMM (ref 4.8–10.8)

## 2018-09-07 NOTE — ED GENERAL ADULT
History of Present Illness
 
General
Chief Complaint: General Adult
Stated Complaint: WEAK, SEEN HERE YEASTERDAY FOR SAME
Source: patient
Exam Limitations: no limitations
 
Vital Signs & Intake/Output
Vital Signs & Intake/Output
 Vital Signs
 
 
Date Time Temp Pulse Resp B/P B/P Pulse O2 O2 Flow FiO2
 
     Mean Ox Delivery Rate 
 
09/07 1527 98.3 89 20 104/68  99 Room Air  
 
09/07 1258 98.3 64 22 106/68  100 Room Air  
 
09/07 1056 98.7 71 20 96/61  96 Room Air  
 
09/07 0843      97 Room Air Room Air 
 
09/07 0751 96.2 76 18 96/63  95 Room Air  
 
 
 
Allergies
Coded Allergies:
butalbital (From FIORICET) (PT REMEMBERS GETTING REALLY SICK 04/07/17)
tramadol (Severe, SEIZURE 04/07/17)
prednisone (LEG CRAMPS 06/26/17)
 
Reconcile Medications
Cholecalciferol (Vitamin D3) 1,000 UNIT TABLET   1 TAB PO DAILY VITAMIN SUPPORT 
(Reported)
Gabapentin (Neurontin) 800 MG TABLET   1 TAB PO TID MENTAL HEALTH  (Reported)
Lamotrigine 100 MG TABLET   1 TAB PO QPM SLEEP  (Reported)
Metoclopramide HCl (Reglan) 10 MG TABLET   1 TAB PO 4 TIMES/DAY nausea
     30 minutes before meals and bedtime
Ondansetron HCl (Zofran) 4 MG TABLET   1 TAB PO Q6-8P nausea
Pantoprazole Sodium (Protonix) 40 MG TABLET.DR   1 TAB PO DAILY ULCER  (Reported
)
Promethazine HCl 25 MG TABLET   1 TAB PO Q6P PRN nausea
Sertraline HCl (Zoloft) 50 MG TABLET   1 TAB PO DAILY MENTAL HEALTH  (Reported)
Sucralfate (Carafate) 1 GRAM TABLET   1 TAB PO 4 TIMES/DAY ULCER  (Reported)
Trazodone HCl 100 MG TABLET   2 TAB PO QPM sleep  (Reported)
 
Triage Note:
PT STATES SHE IS VERY WEAK.  PT WAS SEEN HERE
YESTERDAY FOR SAME AND STATES IT IS GETTING WORSE.
PT WAS TOLD TO SEE PCP BUT STATES SHE COULDN'T GET
THERE.
Triage Nurses Notes Reviewed? yes
Onset: Gradual
Duration: months
Timing: constant
HPI:
48-year-old female
 
Past History
 
Travel History
Traveled to [place] past 21 day No
 
Medical History
Neurological: migraine
EENT: NONE
Cardiovascular: hyperlipidemia, AFIB IN THE PAST
Respiratory: COPD, MRSA PNEUMONIA
Gastrointestinal: colitis, GERD, peptic ulcer disease
Hepatic: NONE
Renal: nephrolithiasis
Musculoskeletal: NONE
Psychiatric: bipolar disease
Endocrine: NONE
Blood Disorders: NONE
Cancer(s): NONE
GYN/Reproductive: UTERINE ABLATION
Other Medical Hx:
Alcohol abuse in the past
History of MRSA: Yes
History of VRE: No
History of CDIFF: No
 
Surgical History
Surgical History: non-contributory
 
Psychosocial History
Who do you live with Patient/Self
Services at Home None
What is your primary language English
Tobacco Use: Current Daily Use
Daily Tobacco Use Amount/Type: => 5 Cigarettes daily
ETOH Use: denies use
Illicit Drug Use: denies illicit drug use
 
Family History
Family History, If Any:
FATHER
  Kidney stones
 
 
Progress
Plan of Care:
 Orders
 
 
Procedure Date/time Status
 
Add-on Test (ER Only) 09/07 1558 Active
 
Add-on Test (ER Only) 09/07 1554 Active
 
PT Evaluate & Treat 09/07 1527 Active
 
Add-on Test (ER Only) 09/07 1116 Active
 
LACTIC ACID 09/07 0830 Complete
 
BLOOD CULTURE 09/07 0818 Active
 
URINALYSIS 09/07 0818 Complete
 
TROPONIN LEVEL 09/07 0818 Complete
 
PHOSPHORUS 09/07 0818 Complete
 
MAGNESIUM 09/07 0818 Complete
 
LIPASE 09/07 0818 Complete
 
HIGH SENSITIVITY CRP 09/07 0818 Complete
 
WESTERGREN SED RATE 09/07 0818 Complete
 
COMPREHENSIVE METABOLIC PANEL 09/07 0818 Complete
 
CBC WITHOUT DIFFERENTIAL 09/07 0818 Complete
 
EKG 09/07 0818 Active
 
 
 Current Medications
 
 
  Sig/Radha Start time  Last
 
Medication Dose  Stop Time Status Admin
 
Ceftriaxone Sodium 1,000 MG ONCE ONE 09/07 1600 AC 
 
(Rocephin)   09/07 1601  
 
Lorazepam 1 MG Q4P PRN 09/07 1515 UNVr 09/07
 
(Ativan)     1514
 
 
 Laboratory Tests
 
 
 
09/07/18 1508:
Urine Color YEL, Urine Clarity HAZY  H, Urine pH 6.0, Ur Specific Gravity 1.010,
Urine Protein NEG, Urine Ketones TRACE  H, Urine Nitrite NEG, Urine Bilirubin 
NEG, Urine Urobilinogen 0.2, Ur Leukocyte Esterase MOD  H, Ur Microscopic 
SEDIMENT EXAMINED, Urine RBC RARE, Urine WBC 50-75  H, Ur Epithelial Cells MANY 
H, Urine Bacteria MANY  H, Urine Hemoglobin SMALL  H, Urine Glucose NEG
 
09/07/18 0830:
Anion Gap 8, Estimated GFR > 60, BUN/Creatinine Ratio 33.3  H, Glucose 104  H, 
Lactic Acid 0.9, Calcium 9.6, Phosphorus 2.1  L, Magnesium 1.9, Total Bilirubin 
0.3, AST 18, ALT 27, Alkaline Phosphatase 57, Troponin I < 0.01, C-React Prot 
High Sens < 0.1  L, Total Protein 6.3, Albumin 3.9, Globulin 2.4, Albumin/
Globulin Ratio 1.6, Lipase 158, CBC w Diff NO MAN DIFF REQ, RBC 3.74  L, MCV 
95.1, MCH 32.0  H, MCHC 33.6, RDW 14.7  H, MPV 7.0  L, Gran % 82.4  H, 
Lymphocytes % 12.6  L, Monocytes % 4.0, Eosinophils % 0.6, Basophils % 0.4, 
Absolute Granulocytes 7.8  H, Absolute Lymphocytes 1.2, Absolute Monocytes 0.4, 
Absolute Eosinophils 0.1, Absolute Basophils 0, ESR Westergren 8
 Microbiology
09/07 0859  BLOOD: Blood Culture - RECD
09/07 0818  BLOOD: Blood Culture - ORD
 
 
 
Initial ED EKG: normal sinus rhythm, no ST T wave changes
 
Departure
 
Departure
Disposition: STILL A PATIENT
Condition: Stable
Clinical Impression
Primary Impression: UTI (urinary tract infection)
Secondary Impressions: Failure to thrive, Weakness
Referrals:
Loi Beasley (PCP/Family)
 
Departure Forms:
Customer Survey
General Discharge Information
 
Admission Note
Spoke With:
Vanessa Ramesh MD
Documentation of Exam:
Documentation of any treatments & extenuating circumstances including Concerns 
Regarding Discharge (functional status, medication knowledge or non-compliance, 
living conditions, etc.) that warrant an admission rather than observation: [IV 
antibiotics, IV hydration, antiemetics, serial lab work to monitor electrolytes,
follow-up and urine culture with narrowing of antibiotics as necessary, physical
therapy consult, case management consult, possible rheumatology and oncology 
consult]

## 2018-09-07 NOTE — CT SCAN REPORT
EXAMINATION:
CT ABDOMEN AND PELVIS WITH CONTRAST
 
CLINICAL INFORMATION:
48-year-old female with nausea, vomiting and diarrhea, and 35 pound weight
loss x6 months.
 
COMPARISON:
Most recent prior CT of the abdomen and pelvis done without contrast on
02/02/2018, and available baseline CT of the abdomen and pelvis done on
03/25/2014.
 
TECHNIQUE:
Multidetector volumetric imaging was performed of the abdomen and pelvis
following IV administration of 95 mL of Optiray 320 intravenous contrast.
Sagittal and coronal reformatted images were obtained on the technologist's
workstation.
 
DLP:
246.66 mGy-cm
 
FINDINGS:
 
LUNG BASES: The visualized lung bases are unremarkable.
 
LIVER, GALLBLADDER, AND BILIARY TREE: The liver is normal in size, shape, and
attenuation. No focal hepatic lesion or biliary ductal dilatation is present.
The gallbladder is unremarkable with no evidence of radiopaque gallstones,
gallbladder wall thickening, or obvious pericholecystic inflammatory changes.
 
 
PANCREAS: Unremarkable.
 
SPLEEN: Unremarkable.
 
ADRENAL GLANDS: Unremarkable.
 
KIDNEYS AND URETERS: The kidneys are normal in size, shape, and attenuation.
No hydronephrosis, hydroureter, or calculi are seen. No perinephric
stranding. An extrarenal pelvis is noted bilaterally (left greater than
right). 2 cortical hypodensities are noted within the right kidney, the
larger seen along the mid medial cortex, measures 1 cm, while the smaller
seen along the mid anterolateral cortex measures 0.4 cm, too small for
accurate characterization. Both were not visualized on the prior noncontrast
images, and likely represent cortical cysts. Follow up renal ultrasound may
be considered for further confirmation.
 
BLADDER: Suboptimally distended, grossly appears unremarkable.
 
GASTROINTESTINAL TRACT: The small and large bowel is unremarkable. The
appendix is unremarkable.
 
ABDOMINAL WALL: No significant hernia is appreciated.
 
LYMPH NODES: No pathologically enlarged retroperitoneal, mesenteric, pelvic
and/or inguinal, groin lymphadenopathy is present.
 
VASCULAR: Mild atherosclerotic disease is noted within the infrarenal, distal
abdominal aorta without aneurysm formation.
 
PELVIC VISCERA: A prominent parametrial venous plexus is noted. The left
ovarian vein measures 0.7 cm, and may represent pelvic venous congestion
syndrome. There is no pelvic mass present. There is no free fluid and/or free
air present. Postsurgical changes are noted on either side of the uterus
consistent with tubal ligation.
 
OSSEOUS STRUCTURES: No suspicious lytic or sclerotic abnormalities.
 
IMPRESSION:
1. No acute intra-abdominal and/or intrapelvic pathology is present.
2. Incidental note is made of 2 right renal cortical hypodensities, too small
for characterization, and likely to represent cortical renal cysts. Follow up
non-emergent renal ultrasound may be considered for further confirmation.
3. A prominent parametrial venous plexus is noted within the pelvis with the
left ovarian vein measuring 0.7 cm, which may represent pelvic venous
congestion syndrome.

## 2018-09-07 NOTE — HISTORY & PHYSICAL
Nathan Jayy Gibsons 18 [id number]:
General Information and HPI
MD Statement:
I have seen and personally examined CAROLINA PASCUAL and documented this H&P.
 
The patient is a 48 year old F who presented with a patient stated chief 
complaint of [Weakness].
 
Source of Information: patient
Exam Limitations: no limitations
History of Present Illness:
Ms. Pascual is a 47 y/o F with PMH significant for bipolar disease, shingles and 
migraines who comes to the ED with a chief complain of weakness. Patient states 
she has been feeling like this for 4 months after she was diagnosed with 
shingles that was treated with valtrex, but has had an acute worsening of her 
status in the past month. She describes it as having "no energy" and feeling SOB
with ambulation. The patient was in the ED yesterday with the same complaints, 
given zofran and phenergan and discharged. She was instructed to return should 
her symptoms worsen. 
 
She states that as a result of this weakness she has fallen several times 
starting three days ago, though no LOC, head trauma, diplopia, blurry vision, 
palpitations or vertigo were noted prior to these falls. She has been "unable to
keep food down", endorsing a 30 pound weight loss over the past 4 months 
alongside night sweats and chills; as per patient, this has been partly due to 
he presence of a sublingual ulcer that has been present for the same amount of 
duration and has prompted multiple visits to our ED. She also endorses the 
presence of nausea and vomiting of one week duration, with the last emetic 
episode occuring this morning of a non-bloody, watery looking vomitus. She 
denies dysuria, increased urinary frequency, neurological deficit, recent travel
history outside of [place], sick contacts, animal/tick bites,  Does note some 
intermittent constipation for the past month. Patient states she is currently 
being followed for "high platelets" and has a follow up appointment to "go over 
bloodwork" and discuss the possibility of doing a bone-marrow biopsy. Noted 
thrombocytosis on prior CBCs on her lifetime summary in [place]. Of note, the 
patient has had prior endoscopy/colonoscopy and was diagnosed with peptic ulcer 
disease, GERD and colitis at [facility]. 
 
 
 
Allergies/Medications
Allergies:
Coded Allergies:
butalbital (From FIORICET) (PT REMEMBERS GETTING REALLY SICK 17)
tramadol (Severe, SEIZURE 17)
prednisone (LEG CRAMPS 17)
 
 
Past History
 
Travel History
Traveled to [place] past 21 day No
 
Medical History
Neurological: migraine
EENT: NONE
Cardiovascular: hyperlipidemia, AFIB IN THE PAST
Respiratory: COPD, MRSA PNEUMONIA
Gastrointestinal: colitis, GERD, peptic ulcer disease
Hepatic: NONE
Renal: nephrolithiasis
Musculoskeletal: NONE
Psychiatric: bipolar disease
Endocrine: NONE
Blood Disorders: NONE
Cancer(s): NONE
GYN/Reproductive: UTERINE ABLATION
Other Medical Hx:
Alcohol abuse in the past
History of MRSA: Yes
History of VRE: No
History of CDIFF: No
 
Surgical History
Surgical History: non-contributory
 
Past Family/Social History
 
Family History
Relations & Conditions if any
FATHER
  Kidney stones
 
 
Psychosocial History
Services at Home: None
Primary Language: English
Smoking Status: Current Everyday Smoker
ETOH Use: denies use (quit 6 years ago)
Illicit Drug Use: denies illicit drug use
Other Social History:
Denies IVDU
 
Functional Ability
ADLs
Independent: dressing, eating, toileting, bathing. 
Ambulation: independent
IADLs
Independent: shopping, housework, finances, food prep, telephone, transportation
, medication admin. 
 
Review of Systems
 
Review of Systems
Constitutional:
Reports: see HPI, weakness. 
EENTM:
Reports: mouth pain.  Denies: blurred vision, double vision, visual changes. 
Cardiovascular:
Reports: no symptoms.  Denies: chest pain, edema, palpitations, peripheral edema
, syncope. 
Respiratory:
Reports: no symptoms, short of breath (with ambulation). 
GI:
Reports: constipation, nausea, vomiting.  Denies: abdominal pain, bloating, 
distention, melena, changes in stool. 
Genitourinary:
Denies: dysuria, frequency, hematuria, pain, urgency. 
Musculoskeletal:
Reports: see HPI. 
Skin:
Reports: no symptoms. 
Neurological/Psychological:
Reports: anxiety, headache, tonic-clonic seizures.  Denies: numbness, 
paresthesia, tingling, tremors. 
Hematologic/Endocrine:
Denies: bruising, bleeding, polyuria, polydipsia. 
Immunologic/Allergic:
Reports: no symptoms. 
 
Exam & Diagnostic Data
Last 24 Hrs of Vital Signs/I&O
 Vital Signs
 
 
Date Time Temp Pulse Resp B/P B/P Pulse O2 O2 Flow FiO2
 
     Mean Ox Delivery Rate 
 
 1939 98.6 61 20 120/64  96   
 
 1808 98.2 96 20 104/70  97 Room Air  
 
 1527 98.3 89 20 104/68  99 Room Air  
 
 1258 98.3 64 22 106/68  100 Room Air  
 
 1056 98.7 71 20 96/61  96 Room Air  
 
 0843      97 Room Air Room Air 
 
 0751 96.2 76 18 96/63  95 Room Air  
 
 
 Intake & Output
 
 
  1600  0800 09 0000
 
Intake Total 1000  
 
Output Total   
 
Balance 1000  
 
    
 
Intake, IV 1000  
 
Patient  96 lb 
 
Weight   
 
Weight  Estimated 
 
Measurement   
 
Method   
 
 
 
 
Physical Exam
General Appearance Alert, Oriented X3, Cooperative, Moderate Distress, Thin-
looking, muscle wasting
Skin Multiple dermal piercings noted
HEENT Atraumatic, PERRLA
Neck Supple, No LAD
Cardiovascular Regular Rate, Normal S1, Normal S2, No Murmurs
Lungs Clear to Auscultation
Abdomen Normal Bowel Sounds, Soft, No Tenderness
Neurological Normal Speech, Sensation Intact, Cranial Nerves 3-12 NL, LE: Muscle
strength 3/5 on the R side, 5/5 on the L side UE: Strength is preserved B/L
Extremities No Edema
Last 24 Hrs of Labs/Andre:
 Laboratory Tests
 
18 1812:
Urine Opiates Screen < 100, Methadone Screen 47, Barbiturate Screen < 60, Ur 
Phencyclidine Scrn < 6.00, Amphetamines Screen 187, U Benzodiazepines Scrn 118, 
Urine Cocaine Screen < 50, Urine Cannabis Screen < 5.00
 
18 1508:
Urine Color YEL, Urine Clarity HAZY  H, Urine pH 6.0, Ur Specific Gravity 1.010,
Urine Protein NEG, Urine Ketones TRACE  H, Urine Nitrite NEG, Urine Bilirubin 
NEG, Urine Urobilinogen 0.2, Ur Leukocyte Esterase MOD  H, Ur Microscopic 
SEDIMENT EXAMINED, Urine RBC RARE, Urine WBC 50-75  H, Ur Epithelial Cells MANY 
H, Urine Bacteria MANY  H, Urine Hemoglobin SMALL  H, Urine Glucose NEG
 
18 0830:
Anion Gap 8, Estimated GFR > 60, BUN/Creatinine Ratio 33.3  H, Glucose 104  H, 
Lactic Acid 0.9, Calcium 9.6, Phosphorus 2.1  L, Magnesium 1.9, Total Bilirubin 
0.3, AST 18, ALT 27, Alkaline Phosphatase 57, Troponin I < 0.01, C-React Prot 
High Sens < 0.1  L, Total Protein 6.3, Albumin 3.9, Globulin 2.4, Albumin/
Globulin Ratio 1.6, Lipase 158, CBC w Diff NO MAN DIFF REQ, RBC 3.74  L, MCV 
95.1, MCH 32.0  H, MCHC 33.6, RDW 14.7  H, MPV 7.0  L, Gran % 82.4  H, 
Lymphocytes % 12.6  L, Monocytes % 4.0, Eosinophils % 0.6, Basophils % 0.4, 
Absolute Granulocytes 7.8  H, Absolute Lymphocytes 1.2, Absolute Monocytes 0.4, 
Absolute Eosinophils 0.1, Absolute Basophils 0, ESR Westergren 8
 Microbiology
 1508  URINE ROUT: Urine Culture - RECD
859  BLOOD: Blood Culture - RECD
818  BLOOD: Blood Culture - ORD
 
 
 
Assessment/Plan
Assessment:
Ms. Pascual is a 47 y/o F 47 y/o F with PMH significant for bipolar disease, 
seizure disorder on lamotrigine, shingles and migraines who comes to the ED with
a chief complain of progressive weakness leading to falls, 30 pound weight loss,
N/V and night sweats. In the ED, patient's VS were stable, and CMP was 
unremarkable. UA notable for increased WBC, bacteria and epithelial cells. CXR 
was unremarkable and CT abd/pelvis showed no acute intra-abdominal or 
intrapelvic pathology. Incidental finding of 2 right renal cortical 
hypodensities noted, likely representing cortical renal cysts. The differential 
at the moment is broad, though her symptoms could indicate an infection (based 
on her UA) vs malignancy of unknown origin. She will be admitted to the general 
medicine floor for further workup and management of the following issues:
 
#Intractable nausea, anorexia, 30-pound weight loss
#Weakness, recurrent falls
#History of bipolar disorder, migraines
 
PLAN
Admit to general medicine floor.
IV Fluids
Antiemetics as needed
Blood cultures, urine culture
F/u CBC, BEP
Will start IV ceftriaxone (probable UTI)
GI, PT consult
Will continue the rest of her home medications. 
 
FULL CODE
Diet: clear liquid, to advance as tolerated
DVT PPX: Lovenox, ALPS
 
As Ranked By This Provider
Problem List:
 1. Migraine
 
 2. Weakness
 
 3. Vomiting
 
 
Core Measures/Misc ()
 
Acute Coronary Syndrome
ACS Diagnosis: No
 
Congestive Heart Failure
Congestive Heart Failure Diagnosis No
 
Cerebrovascular Accident
CVA/TIA Diagnosis: No
 
VTE (View Protocol)
VTE Risk Factors Smoker
No Mechanical VTE Prophylaxis d/t N/A MechProphylax Ordered
No VTE Pharm Prophylaxis d/t NA PharmProphylax ordered
 
Sepsis (View protocol)
Sepsis Present: No
If YES complete Sepsis Event Note If YES complete Sepsis Event Note
 
Latha SUNG,Jos 18 2357:
General Information and HPI
 
Allergies/Medications
Home Med list
Cholecalciferol (Vitamin D3) 1,000 UNIT TABLET   1 TAB PO DAILY VITAMIN SUPPORT 
(Reported)
Clonazepam (Klonopin) 2 MG TABLET   1 TAB PO BID Mental health  (Reported)
Gabapentin (Neurontin) 800 MG TABLET   1 TAB PO TID MENTAL HEALTH  (Reported)
Lamotrigine 100 MG TABLET   1 TAB PO QPM SLEEP  (Reported)
Lubiprostone (Amitiza) 24 MCG CAPSULE   1 CAP PO BID IBS
     .
Metoclopramide HCl (Reglan) 10 MG TABLET   1 TAB PO 4 TIMES/DAY nausea
     30 minutes before meals and bedtime
Ondansetron HCl (Zofran) 4 MG TABLET   1 TAB PO Q6-8P nausea
Pantoprazole Sodium (Protonix) 40 MG TABLET.DR   1 TAB PO BID ULCER  (Reported)
Promethazine HCl 25 MG TABLET   1 TAB PO Q6P PRN nausea
Sertraline HCl (Zoloft) 50 MG TABLET   1 TAB PO DAILY MENTAL HEALTH  (Reported)
Sucralfate (Carafate) 1 GRAM TABLET   1 TAB PO 4 TIMES/DAY ULCER  (Reported)
Trazodone HCl 100 MG TABLET   2 TAB PO QPM sleep  (Reported)
 
 
 
Core Measures/Misc ()
 
Sepsis (View protocol)
If YES complete Sepsis Event Note If YES complete Sepsis Event Note
 
Resident Review Statement
Resident Statement: examined this patient, discussed with intern, agreed with 
intern, amended to note
Other Findings:
Patient is a 48-year-old female with past medical history of bipolar disorder, 
migraines, bronchitis, history of renal calculus status post ESWL presenting 
this admission with chief complaint of weakness and recurrent falls.
 
Patient reports that she has been feeling weak over the past 4 months since 
being diagnosed with shingles.  Patient states that she is presenting today 
because the weakness has worsened over the past week and for the past 3 days she
has had multiple falls.  Denies any head trauma or loss of consciousness.  
Denies any prodromal symptoms including feeling warm, dizziness, chest pain 
however does report palpitations due to anxiety.  Denies any seizure-like 
activity.  Patient states that she has difficulty with walking and requires 
assistance.  Patient states that for the past few months ago had increasing 
anorexia and one day prior to admission reports nausea and 1 episode of 
nonbilious nonbloody vomiting.  Reports alternating diarrhea and constipation.  
Reports fever however reports chills and night sweats.  Reports a 30 pound 
weight loss over the past 4 months.  Patient states this is in part due to the 
ulcer under her tongue and pain with chewing  Patient reports shortness of 
breath with exertion.  Denies headache, blurry vision, double vision, sore 
throat, dysuria, hematuria, frequency, urgency, hematochezia, melena.
 
Patient recently presented to the ED one day prior to admission for similar 
complaints.  At that time patient was discharged with antiemetics and reported 
feeling better for a short period of time.  Patient states that she was able to 
tolerate food.  Patient reports that she recently was admitted to [facility] 
where she had an endoscopy and colonoscopy.  Patient reports multiple ulcers, 
GERD, colitis.  Patient also reports that she was recently worked up for 
thrombocytosis at [facility] and she may require bone marrow biopsy.
 
Patient denies any sick contacts, recent travel, tick bites.  Patient reports 
that she lives by herself and has cats.  Patient denies any cat bite or scratch.
 
Past medical history: As above
Social history: History of alcohol and substance abuse.  Patient reports that 
she has been sober for the past 6 years.  Patient is a current every day smoker 
reports smoking approximately 5-6 cigarettes per day for the past 30 years.
Allergies: butalbital, tramadol (grand mal seizures), prednisone (leg cramping)
Medications: Trazodone 200 mg at night, vitamin D, Neurontin 800 mg 3 times a 
day, lamotrigine 100 mg at night, Reglan 10 mg 3 times a day/daily at bedtime, 
Zofran 4 mg when necessary, Protonix 40 mg twice a day, promethazine 25 mg, 
sertraline 50 mg, sucralfate 1 g 4 times a day
 
Vitals and physical exam as above
 
Patient is a 48-year-old female with past medical history for peptic ulcer 
disease status post recent endoscopy, and every day smoker presenting with a 30 
pound weight loss, anorexia, night sweats, weakness and recurrent falls.
 
There is a broad differential which may explain the patient's symptoms including
infectious causes, possible malignancy such as lymphoma or other GI malignancy, 
and rheumatologic disorder.
 
Patient will be admitted to the general medicine floor for management of the 
followin.  Intractable nausea and anorexia
2.  Weakness and recurrent falls
3. History of bipolar disorder and migraines
 
Plan:
Admit to [facility]
IV fluids
Antiemetics when necessary
Follow-up urine culture and blood cultures
Continue IV ceftriaxone
GI consult in a.m.
PT consult
Contine home medications 
DVT prophylaxis: Alps, Lovenox
Diet: Clear liquid diet will advance as tolerated
Code: Full code
 
 
Gadiel SUNGDamionjaskaran 18 [id number]:
Core Measures/Misc ()
 
Sepsis (View protocol)
If YES complete Sepsis Event Note If YES complete Sepsis Event Note
 
Attending MD Review Statement
 
Attending Statement
Attending MD Statement: examined this patient, discuss w/resident/PA/NP, agreed 
w/resident/PA/NP, reviewed EMR data (avail)
Attending Assessment/Plan:
48F PMH bipolar disorder, HLD, nephrolithiasis, COPD presenting with 6 months of
35 pound unintentional weight loss, persistent nausea, and profound weakness. 
Patient reports being unable to eat due to nausea and vomits anything she tries 
to eat. This has been going on for several weeks but has worsened lately. She 
feels weak and is unable to ambulate without assistance, and requires assistance
with ADLs. She was seen by a hematologist recently who found that she had a 
mildly elevated WBC and platelets, and was told to follow up in 6 months. Her 
WBC and platelets are normal today. CT abdomen revealed pelvic venous congestion
syndrome, CXR normal. She has a UA suggestive of UTI but no symptoms. Will admit
for deconditioning, intractable nausea, unable to ambulate, and UTI. Plan: 
Ceftriaxone, urine culture, GI consult, PT eval, will likely require rehab.

## 2018-09-07 NOTE — RADIOLOGY REPORT
EXAMINATION:
XR CHEST
 
CLINICAL INFORMATION:
Weakness. Infection.
 
COMPARISON:
Chest done on 11/30/2017.
 
TECHNIQUE:
2 views of the chest were obtained.
 
FINDINGS:
Mild hyperinflated lung field is present bilaterally. Both lungs are
symmetrically expanded, appear clear. The Cardiomediastinal silhouette is
within normal limit. There is no pleural effusion or pneumothorax present.
The visualized upper abdomen is unremarkable. Postsurgical changes of
cervical spine fusion is noted, the visualized hardware appear intact.
 
No significant change.
 
IMPRESSION:
No acute cardiopulmonary disease. Specifically, no radiographic evidence of
pneumonia.

## 2018-09-08 VITALS — DIASTOLIC BLOOD PRESSURE: 60 MMHG | SYSTOLIC BLOOD PRESSURE: 114 MMHG

## 2018-09-08 VITALS — DIASTOLIC BLOOD PRESSURE: 60 MMHG | SYSTOLIC BLOOD PRESSURE: 90 MMHG

## 2018-09-08 VITALS — SYSTOLIC BLOOD PRESSURE: 100 MMHG | DIASTOLIC BLOOD PRESSURE: 62 MMHG

## 2018-09-08 LAB
ABSOLUTE GRANULOCYTE CT: 3.2 /CUMM (ref 1.4–6.5)
BASOPHILS # BLD: 0 /CUMM (ref 0–0.2)
BASOPHILS NFR BLD: 0.8 % (ref 0–2)
EOSINOPHIL # BLD: 0.1 /CUMM (ref 0–0.7)
EOSINOPHIL NFR BLD: 1 % (ref 0–5)
ERYTHROCYTE [DISTWIDTH] IN BLOOD BY AUTOMATED COUNT: 14.4 % (ref 11.5–14.5)
GRANULOCYTES NFR BLD: 61.8 % (ref 42.2–75.2)
HCT VFR BLD CALC: 32.6 % (ref 37–47)
LYMPHOCYTES # BLD: 1.4 /CUMM (ref 1.2–3.4)
MCH RBC QN AUTO: 32.2 PG (ref 27–31)
MCHC RBC AUTO-ENTMCNC: 33.8 G/DL (ref 33–37)
MCV RBC AUTO: 95.4 FL (ref 81–99)
MONOCYTES # BLD: 0.5 /CUMM (ref 0.1–0.6)
PLATELET # BLD: 378 /CUMM (ref 130–400)
PMV BLD AUTO: 7.1 FL (ref 7.4–10.4)
RED BLOOD CELL CT: 3.42 /CUMM (ref 4.2–5.4)
WBC # BLD AUTO: 5.2 /CUMM (ref 4.8–10.8)

## 2018-09-09 VITALS — SYSTOLIC BLOOD PRESSURE: 86 MMHG | DIASTOLIC BLOOD PRESSURE: 50 MMHG

## 2018-09-09 VITALS — DIASTOLIC BLOOD PRESSURE: 56 MMHG | SYSTOLIC BLOOD PRESSURE: 98 MMHG

## 2018-09-09 VITALS — DIASTOLIC BLOOD PRESSURE: 60 MMHG | SYSTOLIC BLOOD PRESSURE: 90 MMHG

## 2018-09-09 LAB
ABSOLUTE GRANULOCYTE CT: 2.9 /CUMM (ref 1.4–6.5)
BASOPHILS # BLD: 0.1 /CUMM (ref 0–0.2)
BASOPHILS NFR BLD: 1.1 % (ref 0–2)
EOSINOPHIL # BLD: 0.1 /CUMM (ref 0–0.7)
EOSINOPHIL NFR BLD: 1.7 % (ref 0–5)
ERYTHROCYTE [DISTWIDTH] IN BLOOD BY AUTOMATED COUNT: 14.9 % (ref 11.5–14.5)
GRANULOCYTES NFR BLD: 56.5 % (ref 42.2–75.2)
HCT VFR BLD CALC: 34.4 % (ref 37–47)
LYMPHOCYTES # BLD: 1.6 /CUMM (ref 1.2–3.4)
MCH RBC QN AUTO: 31.8 PG (ref 27–31)
MCHC RBC AUTO-ENTMCNC: 33.2 G/DL (ref 33–37)
MCV RBC AUTO: 95.8 FL (ref 81–99)
MONOCYTES # BLD: 0.4 /CUMM (ref 0.1–0.6)
PLATELET # BLD: 376 /CUMM (ref 130–400)
PMV BLD AUTO: 7.6 FL (ref 7.4–10.4)
RED BLOOD CELL CT: 3.59 /CUMM (ref 4.2–5.4)
WBC # BLD AUTO: 5.1 /CUMM (ref 4.8–10.8)

## 2018-09-09 NOTE — PN- HOUSESTAFF
Ananya Jackson 18:
Subjective
Follow-up For:
Intractable nausea, anorexia, 30-pound weight loss
Weakness, recurrent falls
Subjective:
Pt seen and examined at bedside. She continues to have diffuse abdominal pain 
that is tender to palpation although improved. She told me how she has had 30 lb
weight loss in past few months with recent vomiting of blood in the past month 
where she was diagnosed with ulcers by her primary GI physician. Scheduled for 
an EGD on Tuesday at [place]. Pt concerned to have EGD done at [place] 
considering she is already here. Agrees to advance to regular diet as she has 
been on full liquid diet with no nausea/vomiting. Patient has not had a bowel 
movement 
 
Review of Systems
Constitutional:
Denies: see HPI. 
 
Objective
Last 24 Hrs of Vital Signs/I&O
 Vital Signs
 
 
Date Time Temp Pulse Resp B/P B/P Pulse O2 O2 Flow FiO2
 
     Mean Ox Delivery Rate 
 
 0635 98.6 63 20 98/56  94   
 
 2221 98.3 55 18 100/62  95 Room Air  
 
 1441 98.8 64 18 90/60  95   
 
 
 Intake & Output
 
 
  1600  0800  0000
 
Intake Total  900 575
 
Output Total 200  
 
Balance -200 900 575
 
    
 
Intake, IV  800 475
 
Intake, Oral  100 100
 
Number   0
 
Bowel   
 
Movements   
 
Output, Urine 200  
 
 
 
 
Physical Exam
General Appearance: Alert, Oriented X3, Cooperative, Mild Distress
Skin: Tatoos noted
Skin Temp/Moisture Exam: Warm/Dry
HEENT: EOMI, Mucous Membr. moist/pink
Neck: Supple
Cardiovascular: Normal S1, Normal S2
Lungs: Clear to Auscultation, Normal Air Movement
Abdomen: Soft, No Hepatospenomegaly, No Masses, diffuse tenderness
Extremities: No Edema
Vascular: Normal Pulses
Current Medications:
 Current Medications
 
 
  Sig/Radha Start time  Last
 
Medication Dose Route Stop Time Status Admin
 
Acetaminophen 650 MG Q6P PRN 1930 AC 
 
  PO   
 
Acetaminophen 1,000 MG Q6P PRN 1930 AC 
 
  IV   0819
 
Cholecalciferol 1,000 IU DAILY 900 AC 
 
  PO   0815
 
Dextrose/Sodium  1,000 ML .Q10H  1900 AC 
 
Chloride  IV   1109
 
Docusate Sodium 100 MG BID PRN 1930 AC 
 
  PO   
 
Folic Acid 1 MG DAILY 900 AC 
 
  PO   0816
 
Gabapentin 800 MG Q8 600 AC 
 
  PO   0552
 
Lamotrigine 100 MG QPM  2100 AC 
 
  PO   2103
 
Lorazepam 1 MG Q4P PRN  1515 AC 
 
  PO   1210
 
Metoclopramide HCl 10 MG 4 TIMES/ AC 
 
  PO   1210
 
Multivitamins 1 TAB DAILY 900 AC 
 
  PO   0815
 
Nicotine 7 MG Q24 PRN  1930 AC 
 
  TOP   0756
 
Omeprazole 40 MG DAILY  DC 
 
  PO   0552
 
Ondansetron HCl 4 MG Q6P PRN 1930 AC 
 
  IV   1958
 
Oxycodone/ 1 TAB Q6P  AC 
 
Acetaminophen  PO   1009
 
Pantoprazole Sodium 40 MG BID 2100 AC 
 
  IV   
 
Polyethylene Glycol 17 GM AT BEDTIME PRN  193 AC 
 
  PO   
 
Senna/Docusate Sodium 1 TAB AT BEDTIME  2100 AC 
 
  PO   2102
 
Sertraline HCl 50 MG DAILY 900 AC 
 
  PO   0815
 
Sucralfate 1,000 MG 4 TIMES/ AC 
 
  PO   1210
 
Thiamine HCl 100 MG DAILY 900 AC 
 
  PO   0815
 
Trazodone HCl 200 MG QPM  2100 AC 
 
  PO   2103
 
 
 
 
Last 24 Hrs of Lab/Andre Results
Last 24 Hrs of Labs/Mics:
 Laboratory Tests
 
18 0830:
Anion Gap 5, Estimated GFR > 60, BUN/Creatinine Ratio 14.0, CBC w Diff NO MAN 
DIFF REQ, RBC 3.59  L, MCV 95.8, MCH 31.8  H, MCHC 33.2, RDW 14.9  H, MPV 7.6, 
Gran % 56.5, Lymphocytes % 32.4, Monocytes % 8.3, Eosinophils % 1.7, Basophils %
1.1, Absolute Granulocytes 2.9, Absolute Lymphocytes 1.6, Absolute Monocytes 0.4
, Absolute Eosinophils 0.1, Absolute Basophils 0.1
 
 
Assessment/Plan
Assessment:
Ms. Gonsalves is a 47 y/o F 47 y/o F with PMH significant for bipolar disease, 
seizure disorder on lamotrigine, shingles and migraines who comes to the ED with
a chief complain of progressive weakness leading to falls, 30 pound weight loss,
N/V and night sweats. In the ED, patient's VS were stable, and CMP was 
unremarkable. UA notable for increased WBC, bacteria and epithelial cells. CXR 
was unremarkable and CT abd/pelvis showed no acute intra-abdominal or 
intrapelvic pathology. Incidental finding of 2 right renal cortical 
hypodensities noted, likely representing cortical renal cysts. The differential 
at the moment is broad, though her symptoms could indicate an infection (based 
on her UA) vs malignancy of unknown origin. She will be admitted to the general 
medicine floor for further workup and management of the following issues:
 
: Patient on full liquid diet tolerating well. Continues to have diffuse 
abdominal pain with no nausea or vomiting overnight. IV PPI and NPO after 
midnight as per attending and GI for anticipated procedure in AM. Endoscopy in 
AM. 
 
PROBLEM LIST: 
1. Intractable nausea, anorexia, 30-pound weight loss
2. Weakness, recurrent falls
3. History of bipolar disorder, migraines
 
PLAN
* D5NS @ 100 cc/hr; full liquid diet; NPO after midnight for Endoscopy
* Reglan PRN
* Follow up blood cultures, urine culture
* F/u CBC, BEP
* Follow up GI recommendations
* Will continue the rest of her home medications. 
 
FULL CODE
Diet: Full Luquids ; NPO after midnight
DVT PPX: Lovenox, ALPS
Problem List:
 1. Nausea vomiting and diarrhea
 
 2. Weakness
 
Pain Ratin
Pain Location:
Diffuse Abdominal 
Pain Goal: Pain 4 or less
Pain Plan:
as per pain pathway
Tomorrow's Labs & Rationales:
cbc
bep
 
 
 
Jammie Bran MD 18 1044:
Attending MD Review Statement
 
Attending Statement
Attending MD Statement: examined this patient, discuss w/resident/PA/NP, agreed 
w/resident/PA/NP, reviewed EMR data (avail), discussed with nursing, discussed 
with case mgmt, reviewed images, amended to note
Attending Assessment/Plan:
Patient seen and examined, she states that she is feeling slightly better.  She 
was able to tolerate the full liquids.  GI consult is pending.
 
Vital Signs
 
 
Date Time Temp Pulse Resp B/P B/P Pulse O2 O2 Flow FiO2
 
     Mean Ox Delivery Rate 
 
 0635 98.6 63 20 98/56  94   
 
 2221 98.3 55 18 100/62  95 Room Air  
 
 1441 98.8 64 18 90/60  95   
 
 
on exam; 
aox3, nad. looks malnourished. 
cv; s1,s2, rrr
resp; clear
abd; soft, nt, bs+
ext; no edema
 
 Laboratory Tests
 
 
 
 
 0830
 
Chemistry 
 
  Sodium (137 - 145 mmol/L) 140
 
  Potassium (3.5 - 5.1 mmol/L) 3.7
 
  Chloride (98 - 107 mmol/L) 113  H
 
  Carbon Dioxide (22 - 30 mmol/L) 21  L
 
  Anion Gap (5 - 16) 5
 
  BUN (7 - 17 mg/dL) 7
 
  Creatinine (0.5 - 1.0 mg/dL) 0.5
 
  Estimated GFR (>60 ml/min) > 60
 
  BUN/Creatinine Ratio (7 - 25 %) 14.0
 
Hematology 
 
  CBC w Diff NO MAN DIFF REQ
 
  WBC (4.8 - 10.8 /CUMM) 5.1
 
  RBC (4.20 - 5.40 /CUMM) 3.59  L
 
  Hgb (12.0 - 16.0 G/DL) 11.4  L
 
  Hct (37 - 47 %) 34.4  L
 
  MCV (81.0 - 99.0 FL) 95.8
 
  MCH (27.0 - 31.0 PG) 31.8  H
 
  MCHC (33.0 - 37.0 G/DL) 33.2
 
  RDW (11.5 - 14.5 %) 14.9  H
 
  Plt Count (130 - 400 /CUMM) 376
 
  MPV (7.4 - 10.4 FL) 7.6
 
  Gran % (42.2 - 75.2 %) 56.5
 
  Lymphocytes % (20.5 - 51.1 %) 32.4
 
  Monocytes % (1.7 - 9.3 %) 8.3
 
  Eosinophils % (0 - 5 %) 1.7
 
  Basophils % (0.0 - 2.0 %) 1.1
 
  Absolute Granulocytes (1.4 - 6.5 /CUMM) 2.9
 
  Absolute Lymphocytes (1.2 - 3.4 /CUMM) 1.6
 
  Absolute Monocytes (0.10 - 0.60 /CUMM) 0.4
 
  Absolute Eosinophils (0.0 - 0.7 /CUMM) 0.1
 
  Absolute Basophils (0.0 - 0.2 /CUMM) 0.1
 
 
A/P; 48-year-old female with past medical history significant for peptic ulcer 
disease, colitis, hyperlipidemia, bipolar disorder, significant weight loss over
the last few months, chronic nausea and vomiting who is admitted with 
generalized weakness, consistent nausea and vomiting and unable to keep anything
down.  Urinalysis is dirty but urine culture remains negative.  Patient's white 
count is normal and no fever.  CAT scan did not show any perinephric stranding 
therefore patient was not started on any antibiotics.
Patient able to tolerate full liquids.  I spoke with Dr. Barriga.  She 
recommends IV PPI and n.p.o. after midnight tonight.  We will not advance diet 
at this time due to anticipated procedure in the morning.  Endoscopy will be 
planned for tomorrow.  GI will see the patient either later today or tomorrow.  
Continue the rest of the medications.  Continue gentle IV hydration

## 2018-09-09 NOTE — CONS- GASTROENTEROLOGY
General Information and HPI
 
Consulting Request
Date of Consult: 09/09/18
Requested By:
Vanessa Ramesh MD
 
Reason for Consult:
1.  History of gastric ulcer
2.  Chronic use of an NSAIDs
3.  Lower abdominal pain
4.  Nausea and Vomiting 
Source of Information: patient
Exam Limitations: no limitations
History of Present Illness:
Ms. Carvalho is a 48-year-old female with a past medical history of nausea vomiting
and abdominal pain.  She recently underwent EGD and colonoscopy by a Dr. Lam in
[place].  She was found to have a tubular adenoma on colonoscopy, as well as 
gastric ulcers on EGD.  She was scheduled for an EGD on Tuesday of this week but
presented to [facility] with persistent nausea vomiting and abdominal 
pain.  She has a history of migraine headaches for which she had taken NSAIDs on
a chronic basis.  She has had no melena nor bright red blood per rectum.  Since 
admission to the hospital she has had no further nausea and vomiting and denies 
hematemesis.  She has had no melena no bright red blood per rectum.  She does 
however report that she has chronic constipation and takes Dulcolax on a regular
basis.  She reports that she has a long history of chronic gastrointestinal 
problems although she has never been told in the past that she had irritable 
bowel syndrome.
 
She had a CT Scan of the abdomen with contrast on admission to the hospital.  
The results are as follows:
 
 
FINDINGS:
 
LUNG BASES: The visualized lung bases are unremarkable.
 
LIVER, GALLBLADDER, AND BILIARY TREE: The liver is normal in size, shape, and
attenuation. No focal hepatic lesion or biliary ductal dilatation is present.
The gallbladder is unremarkable with no evidence of radiopaque gallstones,
gallbladder wall thickening, or obvious pericholecystic inflammatory changes.
 
 
PANCREAS: Unremarkable.
 
SPLEEN: Unremarkable.
 
ADRENAL GLANDS: Unremarkable.
 
KIDNEYS AND URETERS: The kidneys are normal in size, shape, and attenuation.
No hydronephrosis, hydroureter, or calculi are seen. No perinephric
stranding. An extrarenal pelvis is noted bilaterally (left greater than
right). 2 cortical hypodensities are noted within the right kidney, the
larger seen along the mid medial cortex, measures 1 cm, while the smaller
seen along the mid anterolateral cortex measures 0.4 cm, too small for
accurate characterization. Both were not visualized on the prior noncontrast
images, and likely represent cortical cysts. Follow up renal ultrasound may
be considered for further confirmation.
 
BLADDER: Suboptimally distended, grossly appears unremarkable.
 
GASTROINTESTINAL TRACT: The small and large bowel is unremarkable. The
appendix is unremarkable.
 
ABDOMINAL WALL: No significant hernia is appreciated.
 
LYMPH NODES: No pathologically enlarged retroperitoneal, mesenteric, pelvic
and/or inguinal, groin lymphadenopathy is present.
 
VASCULAR: Mild atherosclerotic disease is noted within the infrarenal, distal
abdominal aorta without aneurysm formation.
 
PELVIC VISCERA: A prominent parametrial venous plexus is noted. The left
ovarian vein measures 0.7 cm, and may represent pelvic venous congestion
syndrome. There is no pelvic mass present. There is no free fluid and/or free
air present. Postsurgical changes are noted on either side of the uterus
consistent with tubal ligation.
 
OSSEOUS STRUCTURES: No suspicious lytic or sclerotic abnormalities.
 
IMPRESSION:
1. No acute intra-abdominal and/or intrapelvic pathology is present.
2. Incidental note is made of 2 right renal cortical hypodensities, too small
for characterization, and likely to represent cortical renal cysts. Follow up
non-emergent renal ultrasound may be considered for further confirmation.
3. A prominent parametrial venous plexus is noted within the pelvis with the
left ovarian vein measuring 0.7 cm, which may represent pelvic venous
congestion syndrome.
 
 
She is a recent history of shingles for which she was treated with Valtrex.  She
is also been seen in the [facility] ED for evaluation of a sublingual 
ulceration.
 
Allergies/Medications
Allergies:
Coded Allergies:
butalbital (From FIORICET) (PT REMEMBERS GETTING REALLY SICK 04/07/17)
tramadol (Severe, SEIZURE 04/07/17)
prednisone (LEG CRAMPS 06/26/17)
 
Home Med List:
Cholecalciferol (Vitamin D3) 1,000 UNIT TABLET   1 TAB PO DAILY VITAMIN SUPPORT 
(Reported)
Gabapentin (Neurontin) 800 MG TABLET   1 TAB PO TID MENTAL HEALTH  (Reported)
Lamotrigine 100 MG TABLET   1 TAB PO QPM SLEEP  (Reported)
Metoclopramide HCl (Reglan) 10 MG TABLET   1 TAB PO 4 TIMES/DAY nausea
     30 minutes before meals and bedtime
Ondansetron HCl (Zofran) 4 MG TABLET   1 TAB PO Q6-8P nausea
Pantoprazole Sodium (Protonix) 40 MG TABLET.DR   1 TAB PO BID ULCER  (Reported)
Promethazine HCl 25 MG TABLET   1 TAB PO Q6P PRN nausea
Sertraline HCl (Zoloft) 50 MG TABLET   1 TAB PO DAILY MENTAL HEALTH  (Reported)
Sucralfate (Carafate) 1 GRAM TABLET   1 TAB PO 4 TIMES/DAY ULCER  (Reported)
Trazodone HCl 100 MG TABLET   2 TAB PO QPM sleep  (Reported)
 
Current Medications:
 Current Medications
 
 
  Sig/Radha Start time  Last
 
Medication Dose Route Stop Time Status Admin
 
Acetaminophen 650 MG Q6P PRN 09/07 1930 AC 
 
  PO   
 
Acetaminophen 1,000 MG Q6P PRN 09/07 1930 AC 09/09
 
  IV   1442
 
Cholecalciferol 1,000 IU DAILY 09/08 0900 AC 09/09
 
  PO   0815
 
Dextrose/Sodium  1,000 ML .Q20H 09/07 1900 AC 09/09
 
Chloride  IV   1109
 
Docusate Sodium 100 MG BID PRN 09/07 1930 AC 
 
  PO   
 
Folic Acid 1 MG DAILY 09/08 0900 AC 09/09
 
  PO   0816
 
Gabapentin 800 MG Q8 09/08 0600 AC 09/09
 
  PO   1442
 
Lamotrigine 100 MG QPM 09/08 2100 AC 09/08
 
  PO   2103
 
Lorazepam 1 MG Q4P PRN 09/07 1515 AC 09/09
 
  PO   1608
 
Metoclopramide HCl 10 MG 4 TIMES/DAY 09/08 0900 AC 09/09
 
  PO   1803
 
Multivitamins 1 TAB DAILY 09/08 0900 AC 09/09
 
  PO   0815
 
Nicotine 7 MG Q24 PRN 09/07 1930 AC 09/08
 
  TOP   0756
 
Omeprazole 40 MG DAILY AC 09/08 0700 DC 09/09
 
  PO   0552
 
Ondansetron HCl 4 MG Q6P PRN 09/07 1930 AC 09/07
 
  IV   1958
 
Oxycodone/ 1 TAB Q6P PRN 09/08 0900 AC 09/09
 
Acetaminophen  PO   1608
 
Pantoprazole Sodium 40 MG BID 09/09 2100 AC 
 
  IV   
 
Polyethylene Glycol 17 GM AT BEDTIME PRN 09/07 1930 AC 
 
  PO   
 
Senna/Docusate Sodium 1 TAB AT BEDTIME 09/07 2100 AC 09/08
 
  PO   2102
 
Sertraline HCl 50 MG DAILY 09/08 0900 AC 09/09
 
  PO   0815
 
Sucralfate 1,000 MG 4 TIMES/DAY 09/08 0900 AC 09/09
 
  PO   1803
 
Thiamine HCl 100 MG DAILY 09/08 0900 AC 09/09
 
  PO   0815
 
Trazodone HCl 200 MG QPM 09/08 2100 AC 09/08
 
  PO   2103
 
 
 
 
Past History
 
Travel History
Traveled to [place] past 21 day No
 
Medical History
Neurological: migraine
EENT: NONE
Cardiovascular: hyperlipidemia, AFIB IN THE PAST
Respiratory: COPD, MRSA PNEUMONIA
Gastrointestinal: colitis, GERD, peptic ulcer disease
Hepatic: NONE
Renal: nephrolithiasis
Musculoskeletal: NONE
Psychiatric: bipolar disease
Endocrine: NONE
Blood Disorders: NONE
Cancer(s): NONE
GYN/Reproductive: UTERINE ABLATION
Other Medical Hx:
Alcohol abuse in the past
 
Surgical History
Surgical History: non-contributory
 
Family History
Relations & Conditions If Any:
FATHER
  Kidney stones
 
 
Psychosocial History
Services at Home: None
Primary Language: English
Smoking Status: Current Everyday Smoker
ETOH Use: denies use (quit 6 years ago)
Illicit Drug Use: denies illicit drug use
Other Social History:
Denies IVDU
 
Functional Ability
ADLs
Independent: dressing, eating, toileting, bathing. 
Ambulation: independent
IADLs
Independent: shopping, housework, finances, food prep, telephone, transportation
, medication admin. 
 
Review of Systems
 
Review of Systems
Constitutional:
Reports: weakness, unexplained weight loss.  Denies: chills, diaphoresis, fever.
 
EENTM:
Reports: no symptoms, mouth pain. 
Cardiovascular:
Reports: no symptoms. 
Respiratory:
Reports: no symptoms. 
GI:
Reports: see HPI. 
Genitourinary:
Reports: no symptoms. 
Musculoskeletal:
Reports: no symptoms. 
Skin:
Reports: no symptoms. 
Neurological/Psychological:
Reports: anxiety. 
Hematologic/Endocrine:
Reports: no symptoms. 
Immunologic/Allergic:
Reports: no symptoms. 
 
Exam & Diagnostic Data
Vital Signs and I&O
Vital Signs
 
 
Date Time Temp Pulse Resp B/P B/P Pulse O2 O2 Flow FiO2
 
     Mean Ox Delivery Rate 
 
09/09 1353 98.2 70 20 90/60  95 Room Air  
 
09/09 0635 98.6 63 20 98/56  94   
 
09/08 2221 98.3 55 18 100/62  95 Room Air  
 
 
 Intake & Output
 
 
 09/09 1600 09/09 0400 09/08 1600 09/08 0400 09/07 1600 09/07 0400
 
Intake Total 1621 439 4592 120 1000 
 
Output Total 800     
 
Balance  120 1000 
 
       
 
Intake,  062 7530  1000 
 
Intake, Oral 750 100 200 120  
 
Number  0 0   
 
Bowel      
 
Movements      
 
Output, Urine 800     
 
Patient   104 lb 104 lb 96 lb 
 
Weight      
 
Weight    Chair scale Estimated 
 
Measurement      
 
Method      
 
 
 
 
Physical Exam
General Appearance: well developed/nourished, no apparent distress, alert, 
anxious
Head: atraumatic, normal appearance
Eyes:
Bilateral: normal appearance. 
Ears, Nose, Throat: hearing grossly normal
Neck: supple, full range of motion
Respiratory: normal breath sounds
Cardiovascular: regular rate/rhythm, Normal S1 and S2 without rub, murmur, or 
gallop
Gastrointestinal: normal bowel sounds, soft, non-tender, no organomegaly, There 
is no rebound or guarding
Extremities: no edema
Neurologic/Psych: awake, alert, oriented x 3, normal gait, normal mood/affect
Cranial Nerves: Cranial Nerves II - XII grossly intact
Skin: intact, normal color, warm/dry
 
Results
Pertinent Lab Results:
 Laboratory Tests
 
 
 09/09 09/08
 
 0830 0710
 
Chemistry  
 
  Sodium (137 - 145 mmol/L) 140 138
 
  Potassium (3.5 - 5.1 mmol/L) 3.7 3.5
 
  Chloride (98 - 107 mmol/L) 113  H 111  H
 
  Carbon Dioxide (22 - 30 mmol/L) 21  L 21  L
 
  Anion Gap (5 - 16) 5 6
 
  BUN (7 - 17 mg/dL) 7 10
 
  Creatinine (0.5 - 1.0 mg/dL) 0.5 0.5
 
  Estimated GFR (>60 ml/min) > 60 > 60
 
  BUN/Creatinine Ratio (7 - 25 %) 14.0 20.0
 
Hematology  
 
  CBC w Diff NO MAN DIFF REQ NO MAN DIFF REQ
 
  WBC (4.8 - 10.8 /CUMM) 5.1 5.2
 
  RBC (4.20 - 5.40 /CUMM) 3.59  L 3.42  L
 
  Hgb (12.0 - 16.0 G/DL) 11.4  L 11.0  L
 
  Hct (37 - 47 %) 34.4  L 32.6  L
 
  MCV (81.0 - 99.0 FL) 95.8 95.4
 
  MCH (27.0 - 31.0 PG) 31.8  H 32.2  H
 
  MCHC (33.0 - 37.0 G/DL) 33.2 33.8
 
  RDW (11.5 - 14.5 %) 14.9  H 14.4
 
  Plt Count (130 - 400 /CUMM) 376 378
 
  MPV (7.4 - 10.4 FL) 7.6 7.1  L
 
  Gran % (42.2 - 75.2 %) 56.5 61.8
 
  Lymphocytes % (20.5 - 51.1 %) 32.4 27.2
 
  Monocytes % (1.7 - 9.3 %) 8.3 9.2
 
  Eosinophils % (0 - 5 %) 1.7 1.0
 
  Basophils % (0.0 - 2.0 %) 1.1 0.8
 
  Absolute Granulocytes (1.4 - 6.5 /CUMM) 2.9 3.2
 
  Absolute Lymphocytes (1.2 - 3.4 /CUMM) 1.6 1.4
 
  Absolute Monocytes (0.10 - 0.60 /CUMM) 0.4 0.5
 
  Absolute Eosinophils (0.0 - 0.7 /CUMM) 0.1 0.1
 
  Absolute Basophils (0.0 - 0.2 /CUMM) 0.1 0
 
 
 
 
 09/07 09/07
 
 1812 1508
 
Toxicology  
 
  Urine Opiates Screen (>2000 NG/ML) < 100 
 
  Methadone Screen (>300 NG/ML) 47 
 
  Barbiturate Screen (>200 NG/ML) < 60 
 
  Ur Phencyclidine Scrn (>25 NG/ML) < 6.00 
 
  Amphetamines Screen (>1000 NG/ML) 187 
 
  U Benzodiazepines Scrn (>200 NG/ML) 118 
 
  Urine Cocaine Screen (>300 NG/ML) < 50 
 
  Urine Cannabis Screen (>50 NG/ML) < 5.00 
 
Urines  
 
  Urine Color (YEL,AMB,STR)  YEL
 
  Urine Clarity (CLEAR)  HAZY  H
 
  Urine pH (5.0 - 8.0)  6.0
 
  Ur Specific Gravity (1.001 - 1.035)  1.010
 
  Urine Protein (NEG,<30 MG/DL)  NEG
 
  Urine Ketones (NEG)  TRACE  H
 
  Urine Nitrite (NEG)  NEG
 
  Urine Bilirubin (NEG)  NEG
 
  Urine Urobilinogen (0.1  -  1.0 EU/dl)  0.2
 
  Ur Leukocyte Esterase (NEG)  MOD  H
 
  Ur Microscopic  SEDIMENT EXAMINED
 
  Urine RBC (0 - 5 /HPF)  RARE
 
  Urine WBC (0 - 2 /HPF)  50-75  H
 
  Ur Epithelial Cells (NONE,FEW)  MANY  H
 
  Urine Bacteria (NEG/NONE)  MANY  H
 
  Urine Hemoglobin (NEG)  SMALL  H
 
  Urine Glucose (N MG/DL)  NEG
 
 
 
 
 09/07
 
 0830
 
Chemistry 
 
  Sodium (137 - 145 mmol/L) 139
 
  Potassium (3.5 - 5.1 mmol/L) 3.8
 
  Chloride (98 - 107 mmol/L) 110  H
 
  Carbon Dioxide (22 - 30 mmol/L) 20  L
 
  Anion Gap (5 - 16) 8
 
  BUN (7 - 17 mg/dL) 20  H
 
  Creatinine (0.5 - 1.0 mg/dL) 0.6
 
  Estimated GFR (>60 ml/min) > 60
 
  BUN/Creatinine Ratio (7 - 25 %) 33.3  H
 
  Glucose (65 - 99 mg/dL) 104  H
 
  Lactic Acid (0.7 - 2.1 mmol/L) 0.9
 
  Calcium (8.4 - 10.2 mg/dL) 9.6
 
  Phosphorus (2.5 - 4.5 mg/dL) 2.1  L
 
  Magnesium (1.6 - 2.3 mg/dL) 1.9
 
  Total Bilirubin (0.2 - 1.3 mg/dL) 0.3
 
  AST (14 - 36 U/L) 18
 
  ALT (9 - 52 U/L) 27
 
  Alkaline Phosphatase (<127 U/L) 57
 
  Creatine Kinase (30 - 135 U/L) 25  L
 
  Troponin I (< 0.11 ng/ml) < 0.01
 
  C-React Prot High Sens (1.0 - 3.0 mg/L) < 0.1  L
 
  Total Protein (6.3 - 8.2 g/dL) 6.3
 
  Albumin (3.5 - 5.0 g/dL) 3.9
 
  Globulin (1.9 - 4.2 gm/dL) 2.4
 
  Albumin/Globulin Ratio (1.1 - 2.2 %) 1.6
 
  Lipase (23 - 300 U/L) 158
 
  Vitamin B12 (239 - 931 pg/mL) 443
 
  25-OH Vitamin D Total (30 - 100 ng/ml) 39.1
 
  Folate (2.76 - 20.0 ng/mL) 13.6
 
  TSH (0.270 - 4.200 uIU/mL) 1.110
 
  Free T4 (0.64 - 1.79 ng/dL) 1.14
 
Hematology 
 
  CBC w Diff NO MAN DIFF REQ
 
  WBC (4.8 - 10.8 /CUMM) 9.4
 
  RBC (4.20 - 5.40 /CUMM) 3.74  L
 
  Hgb (12.0 - 16.0 G/DL) 12.0
 
  Hct (37 - 47 %) 35.6  L
 
  MCV (81.0 - 99.0 FL) 95.1
 
  MCH (27.0 - 31.0 PG) 32.0  H
 
  MCHC (33.0 - 37.0 G/DL) 33.6
 
  RDW (11.5 - 14.5 %) 14.7  H
 
  Plt Count (130 - 400 /CUMM) 421  H
 
  MPV (7.4 - 10.4 FL) 7.0  L
 
  Gran % (42.2 - 75.2 %) 82.4  H
 
  Lymphocytes % (20.5 - 51.1 %) 12.6  L
 
  Monocytes % (1.7 - 9.3 %) 4.0
 
  Eosinophils % (0 - 5 %) 0.6
 
  Basophils % (0.0 - 2.0 %) 0.4
 
  Absolute Granulocytes (1.4 - 6.5 /CUMM) 7.8  H
 
  Absolute Lymphocytes (1.2 - 3.4 /CUMM) 1.2
 
  Absolute Monocytes (0.10 - 0.60 /CUMM) 0.4
 
  Absolute Eosinophils (0.0 - 0.7 /CUMM) 0.1
 
  Absolute Basophils (0.0 - 0.2 /CUMM) 0
 
  ESR Westergren (0 - 20 MM) 8
 
 
 
 
Assessment/Plan
Assessment/Recommendations:
ASSESSMENT:
1.  IBS-Constipation Predominant
2.  History of Gastric Ulcer with Nausea and Vomiting
3.  Chronic Use of NSAIDs
4,  Lower abdominal pain possible related to pelvic congestion
5.  Chronic Constipation with symptoms suggestive of Irritable Bowel Syndrome.  
I have discussed in detail with the patient the diagnosis of irritable bowel 
syndrome.  She has had a long history of chronic constipation associated with 
irregular bowel habit and abdominal pain preceding and sometimes persisting 
after bowel movement.  We have discussed that this is a visceral 
hypersensitivity syndrome and is treated symptomatically.  We have discussed 
that this does not progress to cancer or inflammatory bowel disease and that it 
can be managed and that her symptoms can be improved.  All questions have been 
answered.
 
RECOMMENDATIONS:
1.  NPO after midnight
2.  Protonix 40 mg IV BID
3.  Consult gynecology regarding pelvic congestion syndrome given findings on CT
4.  Start Amitiza 24 mcg twice daily for IBS-C
 
 
Consult Acknowledgment
- Thank you for your consult request.

## 2018-09-10 VITALS — SYSTOLIC BLOOD PRESSURE: 92 MMHG | DIASTOLIC BLOOD PRESSURE: 60 MMHG

## 2018-09-10 VITALS — DIASTOLIC BLOOD PRESSURE: 60 MMHG | SYSTOLIC BLOOD PRESSURE: 90 MMHG

## 2018-09-10 VITALS — DIASTOLIC BLOOD PRESSURE: 40 MMHG | SYSTOLIC BLOOD PRESSURE: 80 MMHG

## 2018-09-10 VITALS — SYSTOLIC BLOOD PRESSURE: 86 MMHG | DIASTOLIC BLOOD PRESSURE: 42 MMHG

## 2018-09-10 LAB
ABSOLUTE GRANULOCYTE CT: 3.8 /CUMM (ref 1.4–6.5)
BASOPHILS # BLD: 0.1 /CUMM (ref 0–0.2)
BASOPHILS NFR BLD: 1 % (ref 0–2)
EOSINOPHIL # BLD: 0.1 /CUMM (ref 0–0.7)
EOSINOPHIL NFR BLD: 1.5 % (ref 0–5)
ERYTHROCYTE [DISTWIDTH] IN BLOOD BY AUTOMATED COUNT: 14.3 % (ref 11.5–14.5)
GRANULOCYTES NFR BLD: 63 % (ref 42.2–75.2)
HCT VFR BLD CALC: 32.1 % (ref 37–47)
LYMPHOCYTES # BLD: 1.6 /CUMM (ref 1.2–3.4)
MCH RBC QN AUTO: 32.2 PG (ref 27–31)
MCHC RBC AUTO-ENTMCNC: 33.7 G/DL (ref 33–37)
MCV RBC AUTO: 95.6 FL (ref 81–99)
MONOCYTES # BLD: 0.5 /CUMM (ref 0.1–0.6)
PLATELET # BLD: 381 /CUMM (ref 130–400)
PMV BLD AUTO: 7.3 FL (ref 7.4–10.4)
RED BLOOD CELL CT: 3.36 /CUMM (ref 4.2–5.4)
WBC # BLD AUTO: 6.1 /CUMM (ref 4.8–10.8)

## 2018-09-10 PROCEDURE — 0DB98ZX EXCISION OF DUODENUM, VIA NATURAL OR ARTIFICIAL OPENING ENDOSCOPIC, DIAGNOSTIC: ICD-10-PCS

## 2018-09-10 PROCEDURE — 0DB68ZX EXCISION OF STOMACH, VIA NATURAL OR ARTIFICIAL OPENING ENDOSCOPIC, DIAGNOSTIC: ICD-10-PCS

## 2018-09-10 NOTE — PATIENT DISCHARGE INSTRUCTIONS
Discharge Instructions
 
General Discharge Information
You were seen/treated for:
Irritable Bowel Syndrome
Special Instructions:
Please follow up with your PCP within 1 week of this hospital admission.
Please follow up with PSychiatrist within 1 week of discharge for refill of 
Aderall
 
You have been provided a referral to a gastroenterologist. Please follow up 
within 2 weeks with the GI for continued management of your IBS. 
 
Acute Coronary Syndrome
 
Inclusion Criteria
At DC or during hospital stay patient has or had the following:
ACS DIAGNOSIS No
 
Discharge Core Measures
Meds if any: Prescribed or Continued at Discharge
Meds if any: NOT Prescribed or Continued at Discharge
 
Congestive Heart Failure
 
Inclusion Criteria
At DC or during hospital stay patient has or had the following:
CHF DIAGNOSIS No
 
Discharge Core Measures
Meds if any: Prescribed or Continued at Discharge
Meds if any: NOT Prescribed or Continued at Discharge
 
Cerebrovascular accident
 
Inclusion Criteria
At DC or during hospital stay patient has or had the following:
CVA/TIA Diagnosis No
 
Discharge Core Measures
Meds if any: Prescribed or Continued at Discharge
Meds if any: NOT Prescribed or Continued at Discharge
 
Venous thromboembolism
 
Inclusion Criteria
VTE Diagnosis No
VTE Type NONE
VTE Confirmed by (Test) NONE
 
Discharge Core Measures
- Per Current guidelines, there needs to be overlap
- treatment for the first 5 days of Warfarin therapy.
- If discharged on Warfarin prior to 5 days of
- overlap therapy, the patient will need to be
- assessed for post discharge needs including
- *Post discharge parental anticoagulation
- *Warfarin and/or parental anticoagulation education
- *Follow up date to check INR post discharge
At least 5 days overlap therapy as Inpatient No
Meds if any: Prescribed or Continued at Discharge
Note: Overlap Therapy is Warfarin and Anticoagulant
Meds if any: NOT Prescribed or Continued at Discharge

## 2018-09-10 NOTE — PN- HOUSESTAFF
Juni Mendoza 09/10/18 0653:
Subjective
Follow-up For:
Intractable nausea
Anorexia, 30-pound weight loss
Weakness, recurrent falls
Subjective:
Pt seen and examined today. She still complains of generalized aches, weakness, 
diffusely all over the body. She was tearful with regards to the uncertainty of 
what is happening; she was updated on her plan of care. She is due for an EGD 
today. She is afebrile, with no other complains. 
 
Review of Systems
Constitutional:
Reports: see HPI. 
 
Objective
Last 24 Hrs of Vital Signs/I&O
 Vital Signs
 
 
Date Time Temp Pulse Resp B/P B/P Pulse O2 O2 Flow FiO2
 
     Mean Ox Delivery Rate 
 
09/10 0718 98.2 66 20 90/60  95   
 
09/10 0030 98.3 66 20 92/60  94   
 
 2205 98.5 66 20 86/50  94 Room Air  
 
 1353 98.2 70 20 90/60  95 Room Air  
 
 
 Intake & Output
 
 
 09/10 1600 09/10 0800 09/10 0000
 
Intake Total  800 400
 
Output Total   
 
Balance  800 400
 
    
 
Intake, IV  800 300
 
Intake, Oral   100
 
Number  0 
 
Bowel   
 
Movements   
 
 
 
 
Physical Exam
General Appearance: Alert, Oriented X3, Mild Distress, Tearful, worried
Skin: Multiple dermal piercings and tattoos noted
HEENT: Atraumatic
Neck: Supple
Cardiovascular: Regular Rate, Normal S1, Normal S2
Lungs: Clear to Auscultation
Abdomen: Normal Bowel Sounds, Soft, Diffuse tenderness across all abdominal 
quadrants
Neurological: Normal Speech
Extremities: No Clubbing, No Cyanosis, No Edema
Current Medications:
 Current Medications
 
 
  Sig/Rahda Start time  Last
 
Medication Dose Route Stop Time Status Admin
 
Acetaminophen 650 MG Q6P PRN 1930 AC 
 
  PO   
 
Acetaminophen 1,000 MG Q6P PRN 1930 AC 
 
  IV   2016
 
Cholecalciferol 1,000 IU DAILY 900 AC 09/10
 
  PO   0859
 
Dextrose/Sodium  1,000 ML Q10H 2215 CAN 
 
Chloride  IV   
 
Dextrose/Sodium  1,000 ML Q10H 2215 AC 09/10
 
Chloride  IV   1221
 
Dextrose/Sodium  1,000 ML .Q20H  1900 DC 
 
Chloride  IV   2018
 
Docusate Sodium 100 MG BID PRN 1930 AC 
 
  PO   
 
Folic Acid 1 MG DAILY 900 AC 09/10
 
  PO   0900
 
Gabapentin 800 MG Q8 600 AC 09/10
 
  PO   0542
 
Lamotrigine 100 MG QPM 2100 AC 
 
  PO   
 
Lorazepam 1 MG Q4P PRN  1515 AC 09/10
 
  PO   0621
 
Lubiprostone 24 MCG BID 2100 AC 09/10
 
  PO   0900
 
Metoclopramide HCl 10 MG 4 TIMES/ AC 09/10
 
  PO   900
 
Multivitamins 1 TAB DAILY 900 AC 09/10
 
  PO   0859
 
Nicotine 7 MG Q24 PRN  1930 AC 
 
  TOP   0756
 
Omeprazole 40 MG DAILY AC 2015 CAN 
 
  PO   
 
Ondansetron HCl 4 MG Q6P PRN 1930 AC 
 
  IV   1958
 
Oxycodone/ 1 TAB Q4-6 PRN PRN 09/10 1030 AC 09/10
 
Acetaminophen  PO   1042
 
Oxycodone/ 1 TAB Q6P  DC 09/10
 
Acetaminophen  PO   621
 
Pantoprazole Sodium 40 MG BID 2100 AC 09/10
 
  IV   0859
 
Polyethylene Glycol 17 GM AT BEDTIME PRN 1930 AC 
 
  PO   
 
Senna/Docusate Sodium 1 TAB AT BEDTIME 2100 AC 
 
  PO   2016
 
Sertraline HCl 50 MG DAILY 900 AC 09/10
 
  PO   0859
 
Sucralfate 1,000 MG 4 TIMES/ AC 09/10
 
  PO   09
 
Thiamine HCl 100 MG DAILY 900 AC 09/10
 
  PO   900
 
Trazodone HCl 200 MG QPM 2100 AC 
 
  PO   
 
 
 
 
Last 24 Hrs of Lab/Andre Results
Last 24 Hrs of Labs/Mics:
 Laboratory Tests
 
09/10/18 0810:
Anion Gap 7, Estimated GFR > 60, BUN/Creatinine Ratio 11.7, CBC w Diff NO MAN 
DIFF REQ, RBC 3.36  L, MCV 95.6, MCH 32.2  H, MCHC 33.7, RDW 14.3, MPV 7.3  L, 
Gran % 63.0, Lymphocytes % 26.9, Monocytes % 7.6, Eosinophils % 1.5, Basophils %
1.0, Absolute Granulocytes 3.8, Absolute Lymphocytes 1.6, Absolute Monocytes 0.5
, Absolute Eosinophils 0.1, Absolute Basophils 0.1
 
 
Assessment/Plan
Assessment:
Ms. Gonsalves is a 49 y/o F 49 y/o F with PMH significant for bipolar disease, 
shingles and migraines who comes to the ED with a chief complain of progressive 
weakness leading to falls, 30 pound weight loss, N/V and night sweats. In the ED
, patient's VS were stable, and CMP was unremarkable. UA notable for increased 
WBC, bacteria and epithelial cells. CXR was unremarkable and CT abd/pelvis 
showed no acute intra-abdominal or intrapelvic pathology. Incidental finding of 
2 right renal cortical hypodensities noted, likely representing cortical renal 
cysts and pelvic congestion syndrome. She will be admitted to the general 
medicine floor for further workup and management of the following issues:
 
PROBLEM LIST
#Intractable nausea, anorexia, 30-pound weight loss
#Weakness, recurrent falls
#History of bipolar disorder, migraines
#Mild Protein calorie malnutrition
 
PLAN
* Pt was NPO overnight for EGD today. Follow up EGD report. 
* D5NS @ 100 cc/hr
* Reglan PRN
* Blood cultures and urinary cultures no growth yet
* Started on protonix 40 mg IV BID, and amitza 24 mcg BID for IBS - constipation
predominant. 
* Dietary department following - ensure TID. Will advance diet to full liquid 
and then as tolerated.
* Will continue the rest of her home medications. 
 
FULL CODE
Diet: full liquid, to advance as tolerated
DVT PPX: Lovenox, ALPS
Problem List:
 1. Weakness
 
 2. Nausea vomiting and diarrhea
 
 3. Tongue ulcer
 
Pain Ratin
Pain Location:
diffuse ache across the body
Pain Goal: Pain 4 or less
Pain Plan:
as indicaated
Tomorrow's Labs & Rationales:
.
 
 
Ángel Nassar MD 09/10/18 2024:
Attending MD Review Statement
 
Attending Statement
Attending MD Statement: examined this patient, discuss w/resident/PA/NP, agreed 
w/resident/PA/NP, reviewed EMR data (avail), discussed with nursing, discussed 
with case mgmt, amended to note
Attending Assessment/Plan:
The patient was seen and discussed with house staff, nursing, and case 
management. GI input appreciated. EGD results as per GI. Note- the patient does 
not have UTI (Lactobacillus is not a pathogen). Will not treat with antibiotics.
CTPMP was checked and the patient is receiving meds form [facility] (Klonopin 
and ? stimulant) and pain medication from [facility]. GI suggests psychiatry 
and gyn consults (pelvic congestion on pelvic CT). Original PT note suggested 
STR, however will have them re-evaluate.

## 2018-09-10 NOTE — PROC NOTE ENDOSCOPY
Endoscopy Procedure
Medical History: unchanged
Mental Status: alert/oriented
Heart/Lung Eval Prior to Sedation: within normal limits
Candidate for Sedation? Yes
Procedure Date: 09/10/18
Procedure Type: EGD w/biopsy
:
MD Melendez Deborah E.
ASA Classification: III
Indications:
1.  History of gastric ulcers
2.  Abdominal pain
3.  Chronic use of NSAIDs
Instrument: diagnostic gastroscope
Meds Received: MAC
Patient's Tolerance: good
Complications: none
Extent Reached: second part of duodenum
Procedure:
Note: Informed consent was obtained prior to procedure.  Risks and benefits of 
procedure were discussed with patient.  Potential complications discussed 
included perforation, bleeding, abdominal pain, and adverse reaction to 
medications.  It was explained that iany or all of these complications could 
result in the need for extended hospitalization, emergency surgery, transfusion 
of packed red blood cells (with the risk of HIV or hepatitis virus), intubation 
with mechanical ventilation, and possible need for antibiotics.  It was further 
explained that an existing tumor polyp or mucosal abnormality might not be 
identified at the time of the procedure thus resulting in a missed opportunity 
for early diagnosis and treatment of a gastrointestinal malignancy or disease 
with possible interval development of a gastrointestinal cancer or other disease
with possible worsening of clinical condition in the interval between 
endoscopies.  It was also discussed that complications are not limited to those 
listed above.  Possible alternatives to endoscopic treatment or evaluation were 
discussed.  All questions were answered.
 
Continuous EKG and blood pressure monitors were attached.  Supplemental oxygen 
was provided with O2 Sat monitoring.  
 
Patient was placed in the left lateral decubitus position.  A surgical timeout 
was performed.  All persons in the room were identified.  All concerns were 
expressed and answered.
 
A bite block was placed in the mouth and sedation was administered by anesthesia
and titrated to comfort prior to starting the procedure.  The Olympus upper 
endoscope was advanced under direct vision to the level of the third portion of 
the duodenum.
 
Esophagus: The esophagus had a normal mucosal vascular pattern throughout its 
entirety.  The GE junction was identified and was normal.  The Z line was 
located at 38 cm from the incisors and was non-displaced.
 
Stomach: The stomach had diffuse glossy erythema with scarring throughout the 
antrum and body.  Retroflexed view of the cardiofundic region revealed a normal 
mucosal and vascular pattern.  There were normal rugae and normal 
distensibility.  The pylorus was patent and easily intubated.  Biopsies were 
obtained from the antrum and angularis to rule out H. Pylori.  Biopsies were 
also obtained from the gastric body and lesser curvature to rule out autoimmune 
gastritis/chronic atrophic gastritis.  There were no ulcers noted within the 
gastric antrum body or fundus.
 
Duodenum: The duodenum was fully examined from bulb down to the third portion.  
There was a normal mucosal vascular pattern throughout.  There is an area of 
mild erythema in the immediate post bulbar region suggestive, but not diagnostic
, of a healed ulcer.  There was mild patchy mucosal atrophy.  Biopsies were 
obtained from D1, D2 and the duodenal bulb to rule out celiac disease.  Over 6 
separate bites were obtained.
 
With the endoscope in the forward-viewing position, it was slowly withdrawn and 
all areas were re-inspected and findings are as described previously.  Patient 
tolerated the procedure well.
 
EBL:  1 ml
 
Specimens Removed:
1.  D1, D2 and the duodenal bulb to rule out celiac disease.
2.  antrum and angularis to rule out H. Pylori. 
3.  gastric body and lesser curvature to rule out autoimmune gastritis/chronic 
atrophic gastritis.
 
Findings:
1.  Gastritis with glossy erythema and scarring involving the antrum and body
2.  Mild patchy mucosal atrophy within the second portion of the duodenum
 
Impression:
1.  Gastritis with glossy erythema and scarring involving the antrum and body
2.  Mild patchy mucosal atrophy within the second portion of the duodenum
 
Recommendations:
1.  Await pathology from biopsies obtained during EGD
2.  Patient may advance diet
3.  Patient may start pantoprazole 40 mg p.o. every morning
3.  I discussed with patient that she should avoid NSAIDs
4.  Would start patient on amities a 24 mcg twice daily for IBS-constipation 
predominant
5.  GI will sign off for now.  Please do not hesitate to contact us as needed.  
Patient can follow-up with me in the office after discharge for further therapy 
of irritable bowel syndrome.

## 2018-09-11 VITALS — DIASTOLIC BLOOD PRESSURE: 50 MMHG | SYSTOLIC BLOOD PRESSURE: 85 MMHG

## 2018-09-11 VITALS — SYSTOLIC BLOOD PRESSURE: 86 MMHG | DIASTOLIC BLOOD PRESSURE: 48 MMHG

## 2018-09-11 VITALS — DIASTOLIC BLOOD PRESSURE: 60 MMHG | SYSTOLIC BLOOD PRESSURE: 98 MMHG
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
## 2018-09-11 NOTE — IP INCIDENTAL NOTE PSYCH
Incidental Note
Notation:
Adderall is not on the hospital formulary.  The patient does not have any with 
her.  It would be helpful if somebody could bring it in.  Other than that she 
should resume her normal medication on discharge.

## 2018-09-11 NOTE — PN- HOUSESTAFF
Richard Kohli 18 0636:
Subjective
Follow-up For:
Intractable nausea
Anorexia, 30-pound weight loss
Weakness, recurrent falls
Subjective:
Patient was examined today and she wasl lying in th ebed and said she was doing 
well and felt much better after almost a week. SHe said she felt hungry and 
wanted to eat solid food.ALthough she continues to be constipated. 
 
Review of Systems
Constitutional:
Reports: see HPI. 
 
Objective
Last 24 Hrs of Vital Signs/I&O
 Vital Signs
 
 
Date Time Temp Pulse Resp B/P B/P Pulse O2 O2 Flow FiO2
 
     Mean Ox Delivery Rate 
 
607 98.4 62 20 86/48  96 Room Air  
 
09/10 2315    86/42     
 
09/10 2210 98.6 60 16 80/40  95   
 
 
 Intake & Output
 
 
  1600  08 0000
 
Intake Total  980 480
 
Output Total   
 
Balance  980 480
 
    
 
Intake, IV  800 
 
Intake, Oral  180 480
 
 
 
 
Physical Exam
General Appearance: Alert, Oriented X3, Cooperative, No Acute Distress
Cardiovascular: Regular Rate, No Murmurs
Lungs: Clear to Auscultation, Normal Air Movement
Abdomen: Normal Bowel Sounds, Soft, No Tenderness, No Hepatospenomegaly, No 
Masses
Neurological: Normal Speech, Strength at 5/5 X4 Ext, Normal Tone, Sensation 
Intact
Extremities: No Clubbing, No Cyanosis, No Edema, Normal Pulses, No Tenderness/
Swelling
Current Medications:
 Current Medications
 
 
  Sig/Radha Start time  Last
 
Medication Dose Route Stop Time Status Admin
 
Acetaminophen 650 MG Q6P PRN 1930 AC 
 
  PO   
 
Acetaminophen 1,000 MG Q6P PRN 1930 AC 
 
  IV   2016
 
Bisacodyl 10 MG ONCE  DC 
 
  MS  09  
 
Chlorhexidine  0 .STK-MED ONE 09/10 1400 DC 
 
Gluconate  TOP   
 
Cholecalciferol 1,000 IU DAILY 900 AC 
 
  PO   0746
 
Clonazepam 2 MG BID 09/10 2100 AC 
 
  PO 2059  0745
 
Clonazepam 1 MG ONCE ONE 09/10 1600 DC 09/10
 
  PO 09/10 1601  1553
 
Dextrose/Sodium  1,000 ML Q10H  2215 AC 
 
Chloride  IV   1148
 
Docusate Sodium 100 MG BID PRN 1930 AC 
 
  PO   
 
Folic Acid 1 MG DAILY 900 AC 
 
  PO   0746
 
Gabapentin 800 MG Q8 600 AC 
 
  PO   0553
 
Lamotrigine 100 MG QPM 2100 AC 09/10
 
  PO   2132
 
Lubiprostone 24 MCG BID 2100 AC 
 
  PO   0745
 
Metoclopramide HCl 10 MG 4 TIMES/ AC 
 
  PO   1226
 
Multivitamins 1 TAB DAILY 900 AC 
 
  PO   0809
 
Nicotine 7 MG Q24 PRN 1930 AC 
 
  TOP   0756
 
Ondansetron HCl 4 MG Q6P PRN 1930 AC 
 
  IV   1958
 
Oxycodone/ 1 TAB Q4-6 PRN PRN 09/10 1030 AC 
 
Acetaminophen  PO   1117
 
Pantoprazole Sodium 40 MG BID 
 
  IV   0745
 
Polyethylene Glycol 17 GM AT BEDTIME PRN 1930 AC 
 
  PO   0745
 
Potassium Chloride 40 MEQ ONCE ONE 09/10 1600 DC 09/10
 
  PO 09/10 1601  1644
 
Senna/Docusate Sodium 1 TAB AT BEDTIME 2100 AC 09/10
 
  PO   2132
 
Sertraline HCl 50 MG DAILY 900 AC 
 
  PO   0746
 
Sucralfate 1,000 MG 4 TIMES/ AC 
 
  PO   1226
 
Thiamine HCl 100 MG DAILY 900 AC 
 
  PO   0746
 
Trazodone HCl 200 MG QPM 2100 AC 09/10
 
  PO   2132
 
 
 
 
Last 24 Hrs of Lab/Andre Results
Last 24 Hrs of Labs/Mics:
 Laboratory Tests
 
18 0650:
Anion Gap 4  L, Estimated GFR > 60, BUN/Creatinine Ratio 16.0
 
 
Assessment/Plan
Assessment:
Ms. Gonsalves is a 47 y/o F 47 y/o F with PMH significant for bipolar disease, 
shingles and migraines who comes to the ED with a chief complain of progressive 
weakness leading to falls, 30 pound weight loss, N/V and night sweats. In the ED
, patient's VS were stable, and CMP was unremarkable. UA notable for increased 
WBC, bacteria and epithelial cells. CXR was unremarkable and CT abd/pelvis 
showed no acute intra-abdominal or intrapelvic pathology. Incidental finding of 
2 right renal cortical hypodensities noted, likely representing cortical renal 
cysts and pelvic congestion syndrome. She will be admitted to the general 
medicine floor for further workup and management of the following issues:
 
PROBLEM LIST
#Intractable nausea, anorexia, 30-pound weight loss
#Weakness, recurrent falls
#History of bipolar disorder, migraines
#Mild Protein calorie malnutrition
# Pelvic congestion syndrome
 
 
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 
with glosse erythema, scarring of antrum adn body
-Reglan PRN
-Blood cultures and urinary cultures no growth yet
-continue on protonix 40 mg IV BID, and amitza 24 mcg BID for IBS - constipation
predominant possibly due to pain meds
- . Advance diet and see how she tolerates 
- continue home meds.
- Dr Tran was called for Pelvic congestion syndrome who asked to f/u outpatient 
as it is an incidental finding and thepatient is asymptomatic
- PScyhiatry consulted , f/u notes, Patient does nto have her hoime Addrell and 
has been off it for 2 weeks atleast. Hence will consider starting her on Aderall
or Ritalin 5mg bid tomorrrow
 
Problem List:
 1. Vomiting
 
 2. Dehydration
 
 3. Weakness
 
Pain Ratin
Pain Location:
none
Pain Goal: Remain pain free
Pain Plan:
none
Tomorrow's Labs & Rationales:
Ángel Steiner MD 18 1714:
Attending MD Review Statement
 
Attending Statement
Attending MD Statement: examined this patient, discuss w/resident/PA/NP, agreed 
w/resident/PA/NP, discussed with family, reviewed EMR data (avail), discussed 
with nursing, discussed with case mgmt, amended to note
Attending Assessment/Plan:
The patient was seen and discussed with house staff, nursing, & case management.
Appreciate psych input. GI has signed off of case. The patient is improving with
increased appetite, etc. Attempting to restart her usual medications. Did well 
with PT (improved). Will continue to follow with advanced diet and if able plan 
is to discharge with home care tomorrow.

## 2018-09-12 VITALS — SYSTOLIC BLOOD PRESSURE: 88 MMHG | DIASTOLIC BLOOD PRESSURE: 52 MMHG

## 2018-09-12 NOTE — PN- HOUSESTAFF
Richard Kohli 18 0715:
Subjective
Follow-up For:
nausea and vomitting
Subjective:
Patient did not have BM, and has bee tolerating diet well. patient is doing much
better and said that she wants to go home, she discussed about her aderall 
reuiredment and wanted to know if she gets a replacement retalin until then. 
 
Review of Systems
Constitutional:
Reports: see HPI. 
 
Objective
Last 24 Hrs of Vital Signs/I&O
 Vital Signs
 
 
Date Time Temp Pulse Resp B/P B/P Pulse O2 O2 Flow FiO2
 
     Mean Ox Delivery Rate 
 
 0700  65  88/52     
 
 0639 98.3 65 20 88/52  94   
 
 2215 97.8 60 19 98/60  97 Room Air  
 
 
 Intake & Output
 
 
  1600  0800  0000
 
Intake Total  920 1180
 
Output Total   
 
Balance  920 1180
 
    
 
Intake, IV  800 700
 
Intake, Oral  120 480
 
Number  0 
 
Bowel   
 
Movements   
 
 
 
 
Physical Exam
General Appearance: Alert, Oriented X3, Cooperative, No Acute Distress
Cardiovascular: Regular Rate, No Murmurs
Lungs: Clear to Auscultation, Normal Air Movement
Abdomen: Normal Bowel Sounds, Soft, No Tenderness, No Hepatospenomegaly, No 
Masses
Neurological: Normal Speech, Strength at 5/5 X4 Ext, Normal Tone, Sensation 
Intact
Extremities: No Clubbing, No Cyanosis, No Edema, Normal Pulses, No Tenderness/
Swelling
Current Medications:
 Current Medications
 
 
  Sig/Radha Start time  Last
 
Medication Dose Route Stop Time Status Admin
 
Acetaminophen 650 MG Q6P PRN 1930 DCD 
 
  PO   
 
Acetaminophen 1,000 MG Q6P PRN 1930 DCD 
 
  IV   2016
 
Bisacodyl 5 MG ONE ONE  1020 DC 
 
  PO  1021  
 
Cholecalciferol 1,000 IU DAILY 900 DCD 
 
  PO   907
 
Clonazepam 2 MG BID 2100 DCD 
 
  PO 2059  
 
Clonazepam 2 MG BID 09/10 2100 DC 
 
  PO 
 
Dextrose/Sodium  1,000 ML Q10H  2215 DCD 
 
Chloride  IV   905
 
Docusate Sodium 100 MG BID PRN 1930 DCD 
 
  PO   0558
 
Folic Acid 1 MG DAILY 900 DCD 
 
  PO   0907
 
Gabapentin 800 MG Q8 600 DCD 
 
  PO   0557
 
Lamotrigine 100 MG QPM 2100 DCD 
 
  PO   
 
Lubiprostone 24 MCG BID 2100 DCD 
 
  PO   905
 
Metoclopramide HCl 10 MG 4 TIMES/ DCD 
 
  PO   09
 
Multivitamins 1 TAB DAILY 900 DCD 
 
  PO   09
 
Nicotine 7 MG Q24 PRN  1930 DCD 
 
  TOP   0756
 
Ondansetron HCl 4 MG Q6P PRN  1930 DCD 
 
  IV   1958
 
Oxycodone/ 1 TAB Q4-6 PRN PRN 09/10 1030 DCD 
 
Acetaminophen  PO   1127
 
Pantoprazole Sodium 40 MG BID 2100 DCD 
 
  IV   0906
 
Polyethylene Glycol 17 GM AT BEDTIME PRN  1930 DCD 
 
  PO   0745
 
Senna/Docusate Sodium 1 TAB AT BEDTIME 2100 DCD 
 
  PO   
 
Sertraline HCl 50 MG DAILY 900 DCD 
 
  PO   907
 
Sucralfate 1,000 MG 4 TIMES/ DCD 
 
  PO   907
 
Thiamine HCl 100 MG DAILY 900 DCD 
 
  PO   907
 
Trazodone HCl 200 MG QPM 2100 DCD 
 
  PO   
 
 
 
 
Assessment/Plan
Assessment:
Ms. Gonsalves is a 49 y/o F 49 y/o F with PMH significant for bipolar disease, 
shingles and migraines who comes to the ED with a chief complain of progressive 
weakness leading to falls, 30 pound weight loss, N/V and night sweats. In the ED
, patient's VS were stable, and CMP was unremarkable. UA notable for increased 
WBC, bacteria and epithelial cells. CXR was unremarkable and CT abd/pelvis 
showed no acute intra-abdominal or intrapelvic pathology. Incidental finding of 
2 right renal cortical hypodensities noted, likely representing cortical renal 
cysts and pelvic congestion syndrome. She will be admitted to the general 
medicine floor for further workup and management of the following issues:
 
PROBLEM LIST
#Intractable nausea, anorexia, 30-pound weight loss
#Weakness, recurrent falls
#History of bipolar disorder, migraines
#Mild Protein calorie malnutrition
# Pelvic congestion syndrome
 
 
NO further episodes of vomitting
-Reglan PRN
-Blood cultures and urinary cultures showed no growth
-continue on protonix 40 mg IV BID, and amitza 24 mcg BID for IBS - constipation
predominant possibly due to pain meds
- . Advanced diet tolerated well, no nausea or vomitting. BUt patient is 
constipated
- continue home meds.
- Patient does not have her home aderall and CTPMP verified refill of 
prescription. 
- Dr Tran was called for Pelvic congestion syndrome who asked to f/u outpatient 
as it is an incidental finding and thepatient is asymptomatic
- PScyhiatry consulted , f/u notes, Patient does nto have her hoime Addrell and 
has been off it for 2 weeks atleast. CTPMP verified and refill confimred. 
Problem List:
 1. Vomiting
 
Pain Ratin
Pain Location:
none
Pain Goal: Remain pain free
Pain Plan:
none
Tomorrow's Labs & Rationales:
none
 
 
Ángel Nassar MD 18 2030:
Attending MD Review Statement
 
Attending Statement
Attending MD Statement: examined this patient, discuss w/resident/PA/NP, agreed 
w/resident/PA/NP, reviewed EMR data (avail), discussed with nursing, discussed 
with case mgmt, amended to note
Attending Assessment/Plan:
The patient was seen and discussed with house staff. Clinically improved and now
tolerating diet. Doing well with ambulation. OK to discharge to home today with 
follow-up with her PCP and psychiatrist.

## 2018-09-12 NOTE — DISCHARGE SUMMARY
Visit Information
 
Visit Dates
Admission Date:
09/07/18
 
Discharge Date:
09/12/18
 
 
Hospital Course
 
Course
Attending Physician:
Ángel Nassar MD
 
Primary Care Physician:
[facility] Course:
Ms. Gonsalves is a 49 y/o F 49 y/o F with PMH significant for bipolar disease, 
shingles and migraines who comes to the ED with a chief complain of progressive 
weakness leading to falls, 30 pound weight loss, N/V and night sweats. In the ED
, patient's VS were stable, and CMP was unremarkable. UA notable for increased 
WBC, bacteria and epithelial cells. CXR was unremarkable and CT abd/pelvis 
showed no acute intra-abdominal or intrapelvic pathology. Incidental finding of 
2 right renal cortical hypodensities noted, likely representing cortical renal 
cysts and pelvic congestion syndrome. She will be admitted to the general 
medicine floor for further workup and management of the following issues:
 
PROBLEM LIST
#Intractable nausea, anorexia, 30-pound weight loss
#Weakness, recurrent falls
#History of bipolar disorder, migraines
#Mild Protein calorie malnutrition
# Pelvic congestion syndrome
 
 
NO further episodes of vomitting
-Reglan PRN
-Blood cultures and urinary cultures showed no growth
-given protonix 40 mg IV BID, and amitza 24 mcg BID for IBS - constipation 
predominant possibly due to pain meds
- . Advanced diet tolerated well, no nausea or vomitting. BUt patient is 
constipated
- continue home meds.
- Patient does not have her home aderall and CTPMP verified refill of 
prescription. 
- Dr Tran was called for Pelvic congestion syndrome who asked to f/u outpatient 
as it is an incidental finding and thepatient is asymptomatic
- PScyhiatry consulted , f/u notes, Patient does nto have her hoime Addrell and 
has been off it for 2 weeks atleast. CTPMP verified and refill confimred. 
Allergies:
Coded Allergies:
butalbital (From FIORICET) (PT REMEMBERS GETTING REALLY SICK 04/07/17)
tramadol (Severe, SEIZURE 04/07/17)
prednisone (LEG CRAMPS 06/26/17)
 
 
Disposition Summary
 
Disposition
Principal Diagnosis:
Intractable vomitting
Additional Diagnosis:
none
Discharge Disposition: home or self care
 
Discharge Instructions
 
General Discharge Information
Code Status: Full Code
Patient's Diet:
as tolerated
Patient's Activity:
astolerated
Follow-Up Instructions/Appts:
Please follow up with your Psychiatrust for refill of adreall within 1 week of 
discharge.
Please follow up with PCP within 1 week of diacherge
 
Medications at Discharge
Discharge Medications:
Continue taking these medications:
Sertraline HCl (Zoloft) 50 MG TABLET
    1 Tablet ORAL DAILY
    Comments:
       LAST TAKEN:  9/12/18 @ 9 AM
 
Gabapentin (Neurontin) 800 MG TABLET
    1 Tablet ORAL THREE TIMES DAILY
    Comments:
       LAST TAKEN:  9/12/18 @ 6 AM
 
Lamotrigine (Lamotrigine) 100 MG TABLET
    1 Tablet ORAL Every night
    Qty = 90
    Comments:
       LAST TAKEN: 9/11/18 @ 8 PM
 
Cholecalciferol (Vitamin D3) 1,000 UNIT TABLET
    1 Tablet ORAL DAILY
    Comments:
       LAST TAKEN:  9/12/18
 
Trazodone HCl (Trazodone HCl) 100 MG TABLET
    2 Tablet ORAL Every night
    Comments:
       LAST TAKEN: 9/12/18 @ 8 PM
 
Pantoprazole Sodium (Protonix) 40 MG TABLET.DR
    1 Tablet ORAL TWICE DAILY
    Comments:
       LAST TAKEN" 9/12/18 @ 9 AM
 
Sucralfate (Carafate) 1 GRAM TABLET
    1 Tablet ORAL 4 TIMES A DAY
    Comments:
       LAST TAKEN: 9/12/18 @ 9 AM
 
Ondansetron HCl (Zofran) 4 MG TABLET
    1 Tablet ORAL Every 6-8 Hours as Needed
    Qty = 15
    Comments:
       NOT TAKEN IN HOSPITAL
 
Metoclopramide HCl (Reglan) 10 MG TABLET
    1 Tablet ORAL 4 TIMES A DAY
    Qty = 15
    Instructions:
       30 minutes before meals and bedtime
    Comments:
       LAST TAKEN: 9/12/18 @ 9AM
 
Promethazine HCl (Promethazine HCl) 25 MG TABLET
    1 Tablet ORAL EVERY SIX HOURS AS NEEDED as needed for nausea
    Qty = 15
    Comments:
       NOT TAKEN IN HOSPITAL
 
Clonazepam (Klonopin) 2 MG TABLET
    1 Tablet ORAL TWICE DAILY
    Comments:
       LAST TAKEN:  9/12/18 @ 9 AM
 
Start taking the following new medications:
Lubiprostone (Amitiza) 24 MCG CAPSULE
    1 Capsule ORAL TWICE DAILY
    Qty = 60
    No Refills
    Instructions:
       .
    Comments:
       LAST TAKEN: 9/12/18 @ 9 AM
 
 
Copies To:
Loi Beasley
 
Attending MD Review Statement
Documenting Attending:
Ángel Nassar MD
Other Findings:
Agree with the summary of care and plan upon discharge.

## 2022-08-11 NOTE — PN- HOUSESTAFF
"Requested Prescriptions   Pending Prescriptions Disp Refills    topiramate (TOPAMAX) 25 MG tablet [Pharmacy Med Name: TOPIRAMATE 25MG TABLET] 30 tablet 1     Sig: TAKE 1 TABLET BY MOUTH EVERY DAY        Anti-Seizure Meds Protocol  Failed - 8/11/2022 11:36 AM        Failed - Review Authorizing provider's last note.      Refer to last progress notes: confirm request is for original authorizing provider (cannot be through other providers).          Failed - Normal CBC on file in past 26 months     Recent Labs   Lab Test 07/30/22  1352   WBC 11.6*   RBC 4.23   HGB 11.2*   HCT 35.8                      Passed - Recent (12 mo) or future (30 days) visit within the authorizing provider's specialty     Patient has had an office visit with the authorizing provider or a provider within the authorizing providers department within the previous 12 mos or has a future within next 30 days. See \"Patient Info\" tab in inbasket, or \"Choose Columns\" in Meds & Orders section of the refill encounter.              Passed - Normal ALT or AST on file in past 26 months       Recent Labs   Lab Test 07/30/22  1352   ALT 9     Recent Labs   Lab Test 07/30/22  1352   AST 8             Passed - Normal platelet count on file in past 26 months       Recent Labs   Lab Test 07/30/22  1352                  Passed - Medication is active on med list        Passed - No active pregnancy on record        Passed - No positive pregnancy test in last 12 months               Tracee Grande RN  " **See Addendum**
Juni Mendoza 18 0545:
Subjective
Follow-up For:
#Intractable nausea, anorexia, 30-pound weight loss
#Weakness, recurrent falls
Subjective:
Pt seen and examined this AM. She reports feeling weak, with generalized aches 
all over the body. There is some diffuse tenderness across all abdominal 
quadrants. She is afebrile with stable VSS. She is breathing comfortably on room
air. 
 
Review of Systems
Constitutional:
Reports: see HPI. 
 
Objective
Last 24 Hrs of Vital Signs/I&O
 Vital Signs
 
 
Date Time Temp Pulse Resp B/P B/P Pulse O2 O2 Flow FiO2
 
     Mean Ox Delivery Rate 
 
 0655 98.6 61 20 114/60  95   
 
 193 98.6 61 20 120/64  96 Room Air  
 
 193 98.6 61 20 120/64  96   
 
 1808 98.2 96 20 104/70  97 Room Air  
 
 1527 98.3 89 20 104/68  99 Room Air  
 
 1258 98.3 64 22 106/68  100 Room Air  
 
 1056 98.7 71 20 96/61  96 Room Air  
 
 0843      97 Room Air Room Air 
 
 
 Intake & Output
 
 
  0800  0000  1600
 
Intake Total  120 1000
 
Output Total   
 
Balance  120 1000
 
    
 
Intake, IV   1000
 
Intake, Oral  120 
 
Patient  104 lb 
 
Weight   
 
Weight  Chair scale 
 
Measurement   
 
Method   
 
 
 
 
Physical Exam
General Appearance: Alert, Oriented X3, Cooperative, Moderate Distress, Thin, 
with muscle wasting
Skin: multiple tattoos noted
HEENT: Atraumatic
Neck: Supple
Cardiovascular: Regular Rate, Normal S1, Normal S2, No Murmurs
Lungs: Clear to Auscultation
Abdomen: Normal Bowel Sounds, Soft, diffuse tenderness across all abdominal 
quadrants
Neurological: Normal Speech
Extremities: No Edema
Current Medications:
 Current Medications
 
 
  Sig/Radha Start time  Last
 
Medication Dose Route Stop Time Status Admin
 
Acetaminophen 650 MG Q6P PRN 1930 AC 
 
  PO   
 
Acetaminophen 1,000 MG Q6P PRN 1930 AC 
 
  IV   195
 
Ceftriaxone Sodium 0 .STK-MED ONE  1615 DC 
 
  .ROUTE   
 
Ceftriaxone Sodium 1,000 MG ONCE ONE  1600 DC 
 
  IV  1601  1610
 
Cholecalciferol 1,000 IU DAILY 900 AC 
 
  PO   
 
Dextrose/Sodium  1,000 ML .Q10H  1900 AC 
 
Chloride  IV   0430
 
Docusate Sodium 100 MG BID PRN  1930 AC 
 
  PO   
 
Folic Acid 1 MG DAILY 900 AC 
 
  PO   
 
Gabapentin 800 MG Q8  06 AC 
 
  PO   0629
 
Gabapentin 300 MG ONCE ONE  2130 DC 
 
  PO  2131  2158
 
Influenza Virus  0.5 ML ONCE ONE  1845 DC 
 
Vaccine  IM  184  
 
Lamotrigine 100 MG QPM  2100 AC 
 
  PO   
 
Lorazepam 1 MG Q4P PRN  1515 AC 
 
  PO   0439
 
Lorazepam 0.5 MG ONCE ONE  1300 DC 
 
  PO  1301  1315
 
Lorazepam 0.5 MG ONCE ONE 0830 DC 
 
  IV  0831  0830
 
Lorazepam 0 .STK-MED  DC 
 
  .ROUTE   
 
Metoclopramide HCl 10 MG 4 TIMES/ AC 
 
  PO   
 
Multivitamins 1 TAB DAILY 900 AC 
 
  PO   
 
Nicotine 7 MG Q24 PRN 1930 AC 
 
  TOP   
 
Nicotine 0 .STK-MED ONE  1128 DC 
 
  TOP   
 
Nicotine 14 MG ONCE ONE  1115 DC 
 
  TOP  1116  1125
 
Omeprazole 40 MG DAILY  AC 
 
  PO   0629
 
Ondansetron HCl 0 .STK-MED ONE 1938 DC 
 
  .ROUTE   
 
Ondansetron HCl 4 MG Q6P PRN  193 AC 
 
  IV   
 
Ondansetron HCl 4 MG ONCE  DC 
 
  IV  0831  0830
 
Ondansetron HCl 0 .STK-MED  DC 
 
  .ROUTE   
 
Oxycodone/ 1 TAB ONCE  DC 
 
Acetaminophen  PO 0746  
 
Oxycodone/ 1 TAB ONCE  DC 
 
Acetaminophen  PO  010  0124
 
Polyethylene Glycol 17 GM AT BEDTIME PRN  1930 AC 
 
  PO   
 
Senna/Docusate Sodium 1 TAB AT BEDTIME  2100 AC 
 
  PO   
 
Sertraline HCl 50 MG DAILY 900 AC 
 
  PO   
 
Sodium Chloride 1,000 ML BOLUS  DC 
 
    0830
 
Sucralfate 1,000 MG 4 TIMES/ AC 
 
  PO   
 
Thiamine HCl 100 MG DAILY 900 AC 
 
  PO   
 
Trazodone HCl 200 MG QPM  2100 AC 
 
  PO   
 
 
 
 
Last 24 Hrs of Lab/Andre Results
Last 24 Hrs of Labs/Mics:
 Laboratory Tests
 
18 0710:
Sodium Pending, Potassium Pending, Chloride Pending, Carbon Dioxide Pending, 
Anion Gap Pending, BUN Pending, Creatinine Pending, BUN/Creatinine Ratio Pending
, CBC w Diff Pending, WBC Pending, RBC Pending, Hgb Pending, Hct Pending, MCV 
Pending, MCH Pending, MCHC Pending, RDW Pending, Plt Count Pending, MPV Pending
 
18 1812:
Urine Opiates Screen < 100, Methadone Screen 47, Barbiturate Screen < 60, Ur 
Phencyclidine Scrn < 6.00, Amphetamines Screen 187, U Benzodiazepines Scrn 118, 
Urine Cocaine Screen < 50, Urine Cannabis Screen < 5.00
 
18 1508:
Urine Color YEL, Urine Clarity HAZY  H, Urine pH 6.0, Ur Specific Gravity 1.010,
Urine Protein NEG, Urine Ketones TRACE  H, Urine Nitrite NEG, Urine Bilirubin 
NEG, Urine Urobilinogen 0.2, Ur Leukocyte Esterase MOD  H, Ur Microscopic 
SEDIMENT EXAMINED, Urine RBC RARE, Urine WBC 50-75  H, Ur Epithelial Cells MANY 
H, Urine Bacteria MANY  H, Urine Hemoglobin SMALL  H, Urine Glucose NEG
 
18 0830:
Anion Gap 8, Estimated GFR > 60, BUN/Creatinine Ratio 33.3  H, Glucose 104  H, 
Lactic Acid 0.9, Calcium 9.6, Phosphorus 2.1  L, Magnesium 1.9, Total Bilirubin 
0.3, AST 18, ALT 27, Alkaline Phosphatase 57, Creatine Kinase 25  L, Troponin I 
< 0.01, C-React Prot High Sens < 0.1  L, Total Protein 6.3, Albumin 3.9, 
Globulin 2.4, Albumin/Globulin Ratio 1.6, Lipase 158, Vitamin B12 443, 25-OH 
Vitamin D Total 39.1, Folate 13.6, TSH 1.110, Free T4 1.14, CBC w Diff NO MAN 
DIFF REQ, RBC 3.74  L, MCV 95.1, MCH 32.0  H, MCHC 33.6, RDW 14.7  H, MPV 7.0  L
, Gran % 82.4  H, Lymphocytes % 12.6  L, Monocytes % 4.0, Eosinophils % 0.6, 
Basophils % 0.4, Absolute Granulocytes 7.8  H, Absolute Lymphocytes 1.2, 
Absolute Monocytes 0.4, Absolute Eosinophils 0.1, Absolute Basophils 0, ESR 
Westergren 8
 Microbiology
 1508  URINE ROUT: Urine Culture - RECD
859  BLOOD: Blood Culture - RECD
818  BLOOD: Blood Culture - ORD
 
 
 
Assessment/Plan
Assessment:
Ms. Gonsalves is a 47 y/o F 47 y/o F with PMH significant for bipolar disease, 
seizure disorder on lamotrigine, shingles and migraines who comes to the ED with
a chief complain of progressive weakness leading to falls, 30 pound weight loss,
N/V and night sweats. In the ED, patient's VS were stable, and CMP was 
unremarkable. UA notable for increased WBC, bacteria and epithelial cells. CXR 
was unremarkable and CT abd/pelvis showed no acute intra-abdominal or 
intrapelvic pathology. Incidental finding of 2 right renal cortical 
hypodensities noted, likely representing cortical renal cysts. The differential 
at the moment is broad, though her symptoms could indicate an infection (based 
on her UA) vs malignancy of unknown origin. She will be admitted to the general 
medicine floor for further workup and management of the following issues:
 
#Intractable nausea, anorexia, 30-pound weight loss
#Weakness, recurrent falls
#History of bipolar disorder, migraines
 
PLAN
Admit to general medicine floor.
D5NS @ 100 cc/hr
Reglan PRN
Follow up blood cultures, urine culture
F/u CBC, BEP
Follow up GI consult
Will continue the rest of her home medications. 
 
FULL CODE
Diet: clear liquid, to advance as tolerated
DVT PPX: Lovenox, ALPS
Problem List:
 1. Vomiting
 
 2. Aphthous ulcer of tongue
 
 3. Weakness
 
 4. Failure to thrive
 
 5. Nausea vomiting and diarrhea
 
Pain Ratin
Pain Location:
aches across the body
Pain Goal: Pain 4 or less
Pain Plan:
as  indicated
Tomorrow's Labs & Rationales:
avelino Bran MD,Jammie 09/08/18 1619:
Attending MD Review Statement
 
Attending Statement
Attending MD Statement: examined this patient, discuss w/resident/PA/NP, agreed 
w/resident/PA/NP, reviewed EMR data (avail), discussed with nursing, discussed 
with case mgmt, reviewed images, amended to note
Attending Assessment/Plan:
Patient seen and examined, states that currently she is feeling slightly better.
 Wants to full liquids and she was able to tolerate it.  She kept it down.
 
Vital Signs
 
 
Date Time Temp Pulse Resp B/P B/P Pulse O2 O2 Flow FiO2
 
     Mean Ox Delivery Rate 
 
 1441 98.8 64 18 90/60  95   
 
 0655 98.6 61 20 114/60  95   
 
 1939 98.6 61 20 120/64  96 Room Air  
 
 193 98.6 61 20 120/64  96   
 
 1808 98.2 96 20 104/70  97 Room Air  
 
 
on exam; 
aox3, nad. looks malnourished. 
cv; s1,s2, rrr
resp; clear
abd; soft, nt, bs+
ext; no edema
 
 Laboratory Tests
 
 
 
 
 0710 1812
 
Chemistry  
 
  Sodium (137 - 145 mmol/L) 138 
 
  Potassium (3.5 - 5.1 mmol/L) 3.5 
 
  Chloride (98 - 107 mmol/L) 111  H 
 
  Carbon Dioxide (22 - 30 mmol/L) 21  L 
 
  Anion Gap (5 - 16) 6 
 
  BUN (7 - 17 mg/dL) 10 
 
  Creatinine (0.5 - 1.0 mg/dL) 0.5 
 
  Estimated GFR (>60 ml/min) > 60 
 
  BUN/Creatinine Ratio (7 - 25 %) 20.0 
 
Hematology  
 
  CBC w Diff NO MAN DIFF REQ 
 
  WBC (4.8 - 10.8 /CUMM) 5.2 
 
  RBC (4.20 - 5.40 /CUMM) 3.42  L 
 
  Hgb (12.0 - 16.0 G/DL) 11.0  L 
 
  Hct (37 - 47 %) 32.6  L 
 
  MCV (81.0 - 99.0 FL) 95.4 
 
  MCH (27.0 - 31.0 PG) 32.2  H 
 
  MCHC (33.0 - 37.0 G/DL) 33.8 
 
  RDW (11.5 - 14.5 %) 14.4 
 
  Plt Count (130 - 400 /CUMM) 378 
 
  MPV (7.4 - 10.4 FL) 7.1  L 
 
  Gran % (42.2 - 75.2 %) 61.8 
 
  Lymphocytes % (20.5 - 51.1 %) 27.2 
 
  Monocytes % (1.7 - 9.3 %) 9.2 
 
  Eosinophils % (0 - 5 %) 1.0 
 
  Basophils % (0.0 - 2.0 %) 0.8 
 
  Absolute Granulocytes (1.4 - 6.5 /CUMM) 3.2 
 
  Absolute Lymphocytes (1.2 - 3.4 /CUMM) 1.4 
 
  Absolute Monocytes (0.10 - 0.60 /CUMM) 0.5 
 
  Absolute Eosinophils (0.0 - 0.7 /CUMM) 0.1 
 
  Absolute Basophils (0.0 - 0.2 /CUMM) 0 
 
Toxicology  
 
  Urine Opiates Screen (>2000 NG/ML)  < 100
 
  Methadone Screen (>300 NG/ML)  47
 
  Barbiturate Screen (>200 NG/ML)  < 60
 
  Ur Phencyclidine Scrn (>25 NG/ML)  < 6.00
 
  Amphetamines Screen (>1000 NG/ML)  187
 
  U Benzodiazepines Scrn (>200 NG/ML)  118
 
  Urine Cocaine Screen (>300 NG/ML)  < 50
 
  Urine Cannabis Screen (>50 NG/ML)  < 5.00
 
 
A/P; 48-year-old female with past medical history significant for peptic ulcer 
disease, colitis, hyperlipidemia, bipolar disorder, significant weight loss over
the last few months, chronic nausea and vomiting who is admitted with 
generalized weakness, consistent nausea and vomiting and unable to keep anything
down.
Patient started on IV hydration and antiemetics.  Diet is advanced to full 
liquids which she is tolerating.  If continues to tolerate then advance to 
solids in the morning.  GI consult need to be followed.  We will try to obtain 
some records from patient's GI doctor in [place].
Continue the rest of the management.
Please add pharmacologic DVT Prophylaxis.